# Patient Record
Sex: MALE | Race: WHITE | NOT HISPANIC OR LATINO | Employment: FULL TIME | URBAN - NONMETROPOLITAN AREA
[De-identification: names, ages, dates, MRNs, and addresses within clinical notes are randomized per-mention and may not be internally consistent; named-entity substitution may affect disease eponyms.]

---

## 2021-05-17 RX ORDER — SEMAGLUTIDE 1.34 MG/ML
1 INJECTION, SOLUTION SUBCUTANEOUS WEEKLY
Qty: 6 PEN | Refills: 1 | Status: SHIPPED | OUTPATIENT
Start: 2021-05-17 | End: 2021-07-14 | Stop reason: SDUPTHER

## 2021-05-17 RX ORDER — SEMAGLUTIDE 1.34 MG/ML
1 INJECTION, SOLUTION SUBCUTANEOUS WEEKLY
COMMUNITY
Start: 2021-05-12 | End: 2021-05-17 | Stop reason: SDUPTHER

## 2021-07-14 ENCOUNTER — OFFICE VISIT (OUTPATIENT)
Dept: FAMILY MEDICINE | Facility: CLINIC | Age: 56
End: 2021-07-14
Payer: COMMERCIAL

## 2021-07-14 VITALS
SYSTOLIC BLOOD PRESSURE: 147 MMHG | TEMPERATURE: 98 F | OXYGEN SATURATION: 98 % | WEIGHT: 315 LBS | BODY MASS INDEX: 46.65 KG/M2 | HEART RATE: 82 BPM | RESPIRATION RATE: 18 BRPM | HEIGHT: 69 IN | DIASTOLIC BLOOD PRESSURE: 91 MMHG

## 2021-07-14 DIAGNOSIS — I10 ESSENTIAL HYPERTENSION, BENIGN: Primary | ICD-10-CM

## 2021-07-14 DIAGNOSIS — E11.9 TYPE 2 DIABETES MELLITUS WITHOUT COMPLICATION, WITHOUT LONG-TERM CURRENT USE OF INSULIN: ICD-10-CM

## 2021-07-14 DIAGNOSIS — E66.01 CLASS 3 SEVERE OBESITY WITH SERIOUS COMORBIDITY AND BODY MASS INDEX (BMI) OF 45.0 TO 49.9 IN ADULT, UNSPECIFIED OBESITY TYPE: ICD-10-CM

## 2021-07-14 DIAGNOSIS — M19.90 INFLAMMATION OF JOINT: ICD-10-CM

## 2021-07-14 PROBLEM — E66.813 CLASS 3 SEVERE OBESITY WITH SERIOUS COMORBIDITY AND BODY MASS INDEX (BMI) OF 45.0 TO 49.9 IN ADULT: Status: ACTIVE | Noted: 2021-07-14

## 2021-07-14 LAB
ALBUMIN SERPL BCP-MCNC: 3.8 G/DL (ref 3.5–5)
ALBUMIN/GLOB SERPL: 1.1 {RATIO}
ALP SERPL-CCNC: 76 U/L (ref 45–115)
ALT SERPL W P-5'-P-CCNC: 37 U/L (ref 16–61)
ANION GAP SERPL CALCULATED.3IONS-SCNC: 13 MMOL/L (ref 7–16)
AST SERPL W P-5'-P-CCNC: 24 U/L (ref 15–37)
BASOPHILS # BLD AUTO: 0.09 K/UL (ref 0–0.2)
BASOPHILS NFR BLD AUTO: 0.9 % (ref 0–1)
BILIRUB SERPL-MCNC: 0.5 MG/DL (ref 0–1.2)
BILIRUB UR QL STRIP: NEGATIVE
BUN SERPL-MCNC: 13 MG/DL (ref 7–18)
BUN/CREAT SERPL: 15 (ref 6–20)
CALCIUM SERPL-MCNC: 9.2 MG/DL (ref 8.5–10.1)
CHLORIDE SERPL-SCNC: 103 MMOL/L (ref 98–107)
CHOLEST SERPL-MCNC: 132 MG/DL (ref 0–200)
CHOLEST/HDLC SERPL: 3.9 {RATIO}
CLARITY UR: CLEAR
CO2 SERPL-SCNC: 27 MMOL/L (ref 21–32)
COLOR UR: YELLOW
CREAT SERPL-MCNC: 0.88 MG/DL (ref 0.7–1.3)
CREAT UR-MCNC: 190 MG/DL (ref 39–259)
DIFFERENTIAL METHOD BLD: ABNORMAL
EOSINOPHIL # BLD AUTO: 0.28 K/UL (ref 0–0.5)
EOSINOPHIL NFR BLD AUTO: 2.7 % (ref 1–4)
ERYTHROCYTE [DISTWIDTH] IN BLOOD BY AUTOMATED COUNT: 14.8 % (ref 11.5–14.5)
EST. AVERAGE GLUCOSE BLD GHB EST-MCNC: 107 MG/DL
GLOBULIN SER-MCNC: 3.6 G/DL (ref 2–4)
GLUCOSE SERPL-MCNC: 77 MG/DL (ref 74–106)
GLUCOSE UR STRIP-MCNC: NEGATIVE MG/DL
HBA1C MFR BLD HPLC: 5.8 % (ref 4.5–6.6)
HCT VFR BLD AUTO: 42.1 % (ref 40–54)
HDLC SERPL-MCNC: 34 MG/DL (ref 40–60)
HGB BLD-MCNC: 13.6 G/DL (ref 13.5–18)
IMM GRANULOCYTES # BLD AUTO: 0.05 K/UL (ref 0–0.04)
IMM GRANULOCYTES NFR BLD: 0.5 % (ref 0–0.4)
KETONES UR STRIP-SCNC: NORMAL MG/DL
LDLC SERPL CALC-MCNC: 72 MG/DL
LDLC/HDLC SERPL: 2.1 {RATIO}
LEUKOCYTE ESTERASE UR QL STRIP: NEGATIVE
LYMPHOCYTES # BLD AUTO: 2.42 K/UL (ref 1–4.8)
LYMPHOCYTES NFR BLD AUTO: 23.3 % (ref 27–41)
MCH RBC QN AUTO: 29.4 PG (ref 27–31)
MCHC RBC AUTO-ENTMCNC: 32.3 G/DL (ref 32–36)
MCV RBC AUTO: 90.9 FL (ref 80–96)
MICROALBUMIN UR-MCNC: 1.1 MG/DL (ref 0–2.8)
MICROALBUMIN/CREAT RATIO PNL UR: 5.8 MG/G (ref 0–30)
MONOCYTES # BLD AUTO: 0.78 K/UL (ref 0–0.8)
MONOCYTES NFR BLD AUTO: 7.5 % (ref 2–6)
MPC BLD CALC-MCNC: 11.7 FL (ref 9.4–12.4)
NEUTROPHILS # BLD AUTO: 6.78 K/UL (ref 1.8–7.7)
NEUTROPHILS NFR BLD AUTO: 65.1 % (ref 53–65)
NITRITE UR QL STRIP: NEGATIVE
NONHDLC SERPL-MCNC: 98 MG/DL
NRBC # BLD AUTO: 0 X10E3/UL
NRBC, AUTO (.00): 0 %
PH UR STRIP: 6 PH UNITS
PLATELET # BLD AUTO: 250 K/UL (ref 150–400)
POTASSIUM SERPL-SCNC: 4.2 MMOL/L (ref 3.5–5.1)
PROT SERPL-MCNC: 7.4 G/DL (ref 6.4–8.2)
PROT UR QL STRIP: NEGATIVE
RBC # BLD AUTO: 4.63 M/UL (ref 4.6–6.2)
RBC # UR STRIP: NEGATIVE /UL
SODIUM SERPL-SCNC: 139 MMOL/L (ref 136–145)
SP GR UR STRIP: 1.02
TRIGL SERPL-MCNC: 131 MG/DL (ref 35–150)
UROBILINOGEN UR STRIP-ACNC: 0.2 MG/DL
VLDLC SERPL-MCNC: 26 MG/DL
WBC # BLD AUTO: 10.4 K/UL (ref 4.5–11)

## 2021-07-14 PROCEDURE — 85025 CBC WITH DIFFERENTIAL: ICD-10-PCS | Mod: ,,, | Performed by: CLINICAL MEDICAL LABORATORY

## 2021-07-14 PROCEDURE — 3077F SYST BP >= 140 MM HG: CPT | Mod: CPTII,,, | Performed by: NURSE PRACTITIONER

## 2021-07-14 PROCEDURE — 3080F DIAST BP >= 90 MM HG: CPT | Mod: CPTII,,, | Performed by: NURSE PRACTITIONER

## 2021-07-14 PROCEDURE — 80061 LIPID PANEL: CPT | Mod: ,,, | Performed by: CLINICAL MEDICAL LABORATORY

## 2021-07-14 PROCEDURE — 82570 ASSAY OF URINE CREATININE: CPT | Mod: ,,, | Performed by: CLINICAL MEDICAL LABORATORY

## 2021-07-14 PROCEDURE — 83036 HEMOGLOBIN GLYCOSYLATED A1C: CPT | Mod: ,,, | Performed by: CLINICAL MEDICAL LABORATORY

## 2021-07-14 PROCEDURE — 82043 UR ALBUMIN QUANTITATIVE: CPT | Mod: ,,, | Performed by: CLINICAL MEDICAL LABORATORY

## 2021-07-14 PROCEDURE — 3080F PR MOST RECENT DIASTOLIC BLOOD PRESSURE >= 90 MM HG: ICD-10-PCS | Mod: CPTII,,, | Performed by: NURSE PRACTITIONER

## 2021-07-14 PROCEDURE — 83036 HEMOGLOBIN A1C: ICD-10-PCS | Mod: ,,, | Performed by: CLINICAL MEDICAL LABORATORY

## 2021-07-14 PROCEDURE — 3008F PR BODY MASS INDEX (BMI) DOCUMENTED: ICD-10-PCS | Mod: CPTII,,, | Performed by: NURSE PRACTITIONER

## 2021-07-14 PROCEDURE — 3077F PR MOST RECENT SYSTOLIC BLOOD PRESSURE >= 140 MM HG: ICD-10-PCS | Mod: CPTII,,, | Performed by: NURSE PRACTITIONER

## 2021-07-14 PROCEDURE — 80061 LIPID PANEL: ICD-10-PCS | Mod: ,,, | Performed by: CLINICAL MEDICAL LABORATORY

## 2021-07-14 PROCEDURE — 99214 PR OFFICE/OUTPT VISIT, EST, LEVL IV, 30-39 MIN: ICD-10-PCS | Mod: ,,, | Performed by: NURSE PRACTITIONER

## 2021-07-14 PROCEDURE — 80053 COMPREHENSIVE METABOLIC PANEL: ICD-10-PCS | Mod: ,,, | Performed by: CLINICAL MEDICAL LABORATORY

## 2021-07-14 PROCEDURE — 99214 OFFICE O/P EST MOD 30 MIN: CPT | Mod: ,,, | Performed by: NURSE PRACTITIONER

## 2021-07-14 PROCEDURE — 3008F BODY MASS INDEX DOCD: CPT | Mod: CPTII,,, | Performed by: NURSE PRACTITIONER

## 2021-07-14 PROCEDURE — 82043 MICROALBUMIN / CREATININE RATIO URINE: ICD-10-PCS | Mod: ,,, | Performed by: CLINICAL MEDICAL LABORATORY

## 2021-07-14 PROCEDURE — 81003 URINALYSIS, REFLEX TO URINE CULTURE: ICD-10-PCS | Mod: QW,,, | Performed by: CLINICAL MEDICAL LABORATORY

## 2021-07-14 PROCEDURE — 80053 COMPREHEN METABOLIC PANEL: CPT | Mod: ,,, | Performed by: CLINICAL MEDICAL LABORATORY

## 2021-07-14 PROCEDURE — 85025 COMPLETE CBC W/AUTO DIFF WBC: CPT | Mod: ,,, | Performed by: CLINICAL MEDICAL LABORATORY

## 2021-07-14 PROCEDURE — 82570 MICROALBUMIN / CREATININE RATIO URINE: ICD-10-PCS | Mod: ,,, | Performed by: CLINICAL MEDICAL LABORATORY

## 2021-07-14 PROCEDURE — 81003 URINALYSIS AUTO W/O SCOPE: CPT | Mod: QW,,, | Performed by: CLINICAL MEDICAL LABORATORY

## 2021-07-14 RX ORDER — NABUMETONE 750 MG/1
TABLET, FILM COATED ORAL
Qty: 180 TABLET | Refills: 1 | Status: SHIPPED | OUTPATIENT
Start: 2021-07-14 | End: 2022-01-14 | Stop reason: SDUPTHER

## 2021-07-14 RX ORDER — LISINOPRIL 40 MG/1
40 TABLET ORAL DAILY
Qty: 90 TABLET | Refills: 1 | Status: SHIPPED | OUTPATIENT
Start: 2021-07-14 | End: 2022-01-14 | Stop reason: SDUPTHER

## 2021-07-14 RX ORDER — METFORMIN HYDROCHLORIDE 1000 MG/1
1000 TABLET ORAL 2 TIMES DAILY
Qty: 180 TABLET | Refills: 1 | Status: SHIPPED | OUTPATIENT
Start: 2021-07-14 | End: 2022-01-14 | Stop reason: SDUPTHER

## 2021-07-14 RX ORDER — HYDROCHLOROTHIAZIDE 12.5 MG/1
12.5 TABLET ORAL DAILY
Qty: 90 TABLET | Refills: 1 | Status: SHIPPED | OUTPATIENT
Start: 2021-07-14 | End: 2022-01-14 | Stop reason: SDUPTHER

## 2021-07-14 RX ORDER — CARVEDILOL 25 MG/1
25 TABLET ORAL 2 TIMES DAILY
Qty: 180 TABLET | Refills: 1 | Status: SHIPPED | OUTPATIENT
Start: 2021-07-14 | End: 2022-02-21

## 2021-07-14 RX ORDER — SEMAGLUTIDE 1.34 MG/ML
1 INJECTION, SOLUTION SUBCUTANEOUS WEEKLY
Qty: 6 PEN | Refills: 1 | Status: SHIPPED | OUTPATIENT
Start: 2021-07-14 | End: 2022-01-14

## 2021-07-14 RX ORDER — AMLODIPINE BESYLATE 10 MG/1
TABLET ORAL
Qty: 90 TABLET | Refills: 1 | Status: SHIPPED | OUTPATIENT
Start: 2021-07-14 | End: 2022-01-14 | Stop reason: SDUPTHER

## 2021-08-02 ENCOUNTER — IMMUNIZATION (OUTPATIENT)
Dept: FAMILY MEDICINE | Facility: CLINIC | Age: 56
End: 2021-08-02
Payer: COMMERCIAL

## 2021-08-02 DIAGNOSIS — Z23 NEED FOR VACCINATION: Primary | ICD-10-CM

## 2021-08-02 PROCEDURE — 91301 COVID-19, MRNA, LNP-S, PF, 100 MCG/0.5 ML DOSE VACCINE: ICD-10-PCS | Mod: ,,, | Performed by: FAMILY MEDICINE

## 2021-08-02 PROCEDURE — 0011A COVID-19, MRNA, LNP-S, PF, 100 MCG/0.5 ML DOSE VACCINE: ICD-10-PCS | Mod: ,,, | Performed by: FAMILY MEDICINE

## 2021-08-02 PROCEDURE — 0011A COVID-19, MRNA, LNP-S, PF, 100 MCG/0.5 ML DOSE VACCINE: CPT | Mod: ,,, | Performed by: FAMILY MEDICINE

## 2021-08-02 PROCEDURE — 91301 COVID-19, MRNA, LNP-S, PF, 100 MCG/0.5 ML DOSE VACCINE: CPT | Mod: ,,, | Performed by: FAMILY MEDICINE

## 2021-09-02 ENCOUNTER — IMMUNIZATION (OUTPATIENT)
Dept: FAMILY MEDICINE | Facility: CLINIC | Age: 56
End: 2021-09-02
Payer: COMMERCIAL

## 2021-09-02 DIAGNOSIS — Z23 NEED FOR VACCINATION: Primary | ICD-10-CM

## 2021-09-02 PROCEDURE — 91301 COVID-19, MRNA, LNP-S, PF, 100 MCG/0.5 ML DOSE VACCINE: CPT | Mod: ,,, | Performed by: FAMILY MEDICINE

## 2021-09-02 PROCEDURE — 0012A COVID-19, MRNA, LNP-S, PF, 100 MCG/0.5 ML DOSE VACCINE: CPT | Mod: CV19,,, | Performed by: FAMILY MEDICINE

## 2021-09-02 PROCEDURE — 0012A COVID-19, MRNA, LNP-S, PF, 100 MCG/0.5 ML DOSE VACCINE: ICD-10-PCS | Mod: CV19,,, | Performed by: FAMILY MEDICINE

## 2021-09-02 PROCEDURE — 91301 COVID-19, MRNA, LNP-S, PF, 100 MCG/0.5 ML DOSE VACCINE: ICD-10-PCS | Mod: ,,, | Performed by: FAMILY MEDICINE

## 2022-01-05 ENCOUNTER — OFFICE VISIT (OUTPATIENT)
Dept: FAMILY MEDICINE | Facility: CLINIC | Age: 57
End: 2022-01-05
Payer: COMMERCIAL

## 2022-01-05 VITALS
SYSTOLIC BLOOD PRESSURE: 131 MMHG | OXYGEN SATURATION: 97 % | DIASTOLIC BLOOD PRESSURE: 92 MMHG | HEIGHT: 69 IN | RESPIRATION RATE: 18 BRPM | HEART RATE: 86 BPM | WEIGHT: 315 LBS | BODY MASS INDEX: 46.65 KG/M2 | TEMPERATURE: 99 F

## 2022-01-05 DIAGNOSIS — J02.9 SORE THROAT: ICD-10-CM

## 2022-01-05 DIAGNOSIS — J01.10 ACUTE NON-RECURRENT FRONTAL SINUSITIS: Primary | ICD-10-CM

## 2022-01-05 DIAGNOSIS — E11.9 TYPE 2 DIABETES MELLITUS WITHOUT COMPLICATION, WITHOUT LONG-TERM CURRENT USE OF INSULIN: ICD-10-CM

## 2022-01-05 LAB
CTP QC/QA: YES
SARS-COV-2 AG RESP QL IA.RAPID: NEGATIVE

## 2022-01-05 PROCEDURE — 96372 PR INJECTION,THERAP/PROPH/DIAG2ST, IM OR SUBCUT: ICD-10-PCS | Mod: ,,, | Performed by: NURSE PRACTITIONER

## 2022-01-05 PROCEDURE — 1159F PR MEDICATION LIST DOCUMENTED IN MEDICAL RECORD: ICD-10-PCS | Mod: CPTII,,, | Performed by: NURSE PRACTITIONER

## 2022-01-05 PROCEDURE — 3080F PR MOST RECENT DIASTOLIC BLOOD PRESSURE >= 90 MM HG: ICD-10-PCS | Mod: CPTII,,, | Performed by: NURSE PRACTITIONER

## 2022-01-05 PROCEDURE — 96372 THER/PROPH/DIAG INJ SC/IM: CPT | Mod: ,,, | Performed by: NURSE PRACTITIONER

## 2022-01-05 PROCEDURE — 1160F PR REVIEW ALL MEDS BY PRESCRIBER/CLIN PHARMACIST DOCUMENTED: ICD-10-PCS | Mod: CPTII,,, | Performed by: NURSE PRACTITIONER

## 2022-01-05 PROCEDURE — 3075F SYST BP GE 130 - 139MM HG: CPT | Mod: CPTII,,, | Performed by: NURSE PRACTITIONER

## 2022-01-05 PROCEDURE — 87426 SARS CORONAVIRUS 2 ANTIGEN POCT: ICD-10-PCS | Mod: QW,,, | Performed by: NURSE PRACTITIONER

## 2022-01-05 PROCEDURE — 3008F PR BODY MASS INDEX (BMI) DOCUMENTED: ICD-10-PCS | Mod: CPTII,,, | Performed by: NURSE PRACTITIONER

## 2022-01-05 PROCEDURE — 3008F BODY MASS INDEX DOCD: CPT | Mod: CPTII,,, | Performed by: NURSE PRACTITIONER

## 2022-01-05 PROCEDURE — 1160F RVW MEDS BY RX/DR IN RCRD: CPT | Mod: CPTII,,, | Performed by: NURSE PRACTITIONER

## 2022-01-05 PROCEDURE — 1159F MED LIST DOCD IN RCRD: CPT | Mod: CPTII,,, | Performed by: NURSE PRACTITIONER

## 2022-01-05 PROCEDURE — 99214 OFFICE O/P EST MOD 30 MIN: CPT | Mod: 25,,, | Performed by: NURSE PRACTITIONER

## 2022-01-05 PROCEDURE — 99214 PR OFFICE/OUTPT VISIT, EST, LEVL IV, 30-39 MIN: ICD-10-PCS | Mod: 25,,, | Performed by: NURSE PRACTITIONER

## 2022-01-05 PROCEDURE — 3080F DIAST BP >= 90 MM HG: CPT | Mod: CPTII,,, | Performed by: NURSE PRACTITIONER

## 2022-01-05 PROCEDURE — 87426 SARSCOV CORONAVIRUS AG IA: CPT | Mod: QW,,, | Performed by: NURSE PRACTITIONER

## 2022-01-05 PROCEDURE — 3075F PR MOST RECENT SYSTOLIC BLOOD PRESS GE 130-139MM HG: ICD-10-PCS | Mod: CPTII,,, | Performed by: NURSE PRACTITIONER

## 2022-01-05 RX ORDER — CEFTRIAXONE 1 G/1
1 INJECTION, POWDER, FOR SOLUTION INTRAMUSCULAR; INTRAVENOUS
Status: COMPLETED | OUTPATIENT
Start: 2022-01-05 | End: 2022-01-05

## 2022-01-05 RX ORDER — AMOXICILLIN AND CLAVULANATE POTASSIUM 875; 125 MG/1; MG/1
1 TABLET, FILM COATED ORAL EVERY 12 HOURS
Qty: 14 TABLET | Refills: 0 | Status: SHIPPED | OUTPATIENT
Start: 2022-01-05 | End: 2022-01-14

## 2022-01-05 RX ORDER — NAPROXEN SODIUM 220 MG/1
81 TABLET, FILM COATED ORAL DAILY
COMMUNITY

## 2022-01-05 RX ORDER — DEXAMETHASONE SODIUM PHOSPHATE 4 MG/ML
6 INJECTION, SOLUTION INTRA-ARTICULAR; INTRALESIONAL; INTRAMUSCULAR; INTRAVENOUS; SOFT TISSUE
Status: COMPLETED | OUTPATIENT
Start: 2022-01-05 | End: 2022-01-05

## 2022-01-05 RX ORDER — FLUTICASONE PROPIONATE 50 MCG
2 SPRAY, SUSPENSION (ML) NASAL DAILY
Qty: 16 G | Refills: 0 | Status: SHIPPED | OUTPATIENT
Start: 2022-01-05 | End: 2022-01-14

## 2022-01-05 RX ADMIN — CEFTRIAXONE 1 G: 1 INJECTION, POWDER, FOR SOLUTION INTRAMUSCULAR; INTRAVENOUS at 09:01

## 2022-01-05 RX ADMIN — DEXAMETHASONE SODIUM PHOSPHATE 6 MG: 4 INJECTION, SOLUTION INTRA-ARTICULAR; INTRALESIONAL; INTRAMUSCULAR; INTRAVENOUS; SOFT TISSUE at 10:01

## 2022-01-05 NOTE — PATIENT INSTRUCTIONS
Increase fluid intake. Take meds as prescribed. Follow up if no improvement in 5-7 days.    Admission

## 2022-01-05 NOTE — PROGRESS NOTES
Cassidy Garcia DNP, [facility]    [address] [place] [address]     PATIENT NAME: Kalpesh Saleem  : 1965  DATE: 22  MRN: [id number]      Billing Provider: Cassidy Garcia DNP, FNP  Level of Service:   Patient PCP Information     Provider PCP Type    Amol Gonzalez MD General          Reason for Visit / Chief Complaint: Headache, Sore Throat, Pressure Behind the Eyes (Left side), Otalgia (left), and Cough       Update PCP  Update Chief Complaint         History of Present Illness / Problem Focused Workflow     Kalpesh Saleem presents to the clinic with Headache, Sore Throat, Pressure Behind the Eyes (Left side), Otalgia (left), and Cough     Pt c/o pressure to left side of forehead and left side of face. Pt is type 2 diabetic that is well controlled. Last hgbA1c 5.8      Review of Systems     Review of Systems   Constitutional: Negative for activity change and appetite change.   HENT: Positive for ear pain, sinus pressure/congestion and sore throat. Negative for dental problem.    Eyes:        Pressure behind left eye     Respiratory: Positive for cough. Negative for chest tightness and shortness of breath.    Cardiovascular: Negative for chest pain and palpitations.   Gastrointestinal: Negative for abdominal pain.   Genitourinary: Negative for bladder incontinence and dysuria.   Musculoskeletal: Negative for arthralgias.   Integumentary:  Negative for rash.   Neurological: Negative for dizziness, weakness and numbness.        Medical / Social / Family History     Past Medical History:   Diagnosis Date    Diabetes mellitus, type 2     Hyperlipidemia     Hypertension        History reviewed. No pertinent surgical history.    Social History    reports that he has quit smoking. He has never used smokeless tobacco. He reports current alcohol use. He reports that he does not use drugs.    Family History  's family history is not on file.    Medications and  Allergies     Medications  Outpatient Medications Marked as Taking for the 1/5/22 encounter (Office Visit) with Cassidy Garcia DNP, FNP   Medication Sig Dispense Refill    amLODIPine (NORVASC) 10 MG tablet TAKE 1 TABLET BY MOUTH EVERY DAY FOR BLOOD PRESSURE 90 tablet 1    aspirin 81 MG Chew Take 81 mg by mouth once daily.      atorvastatin (LIPITOR) 40 MG tablet TAKE 1 TABLET BY MOUTH AT BEDTIME FOR CHOLESTEROL 90 tablet 1    carvediloL (COREG) 25 MG tablet Take 1 tablet (25 mg total) by mouth 2 (two) times daily. FOR BLOOD PRESSURE 180 tablet 1    hydroCHLOROthiazide (HYDRODIURIL) 12.5 MG Tab Take 1 tablet (12.5 mg total) by mouth once daily. FOR BLOOD PRESSURE 90 tablet 1    lisinopriL (PRINIVIL,ZESTRIL) 40 MG tablet Take 1 tablet (40 mg total) by mouth once daily. FOR BLOOD PRESSURE 90 tablet 1    metFORMIN (GLUCOPHAGE) 1000 MG tablet Take 1 tablet (1,000 mg total) by mouth 2 (two) times daily. FOR DIABETES 180 tablet 1    nabumetone (RELAFEN) 750 MG tablet TAKE 2 TABLETS BY MOUTH EVERY DAY AFTER SUPPER FOR PAIN AND INFLAMMATION 180 tablet 1    OZEMPIC 1 mg/dose (2 mg/1.5 mL) PnIj Inject 1 mg into the skin once a week. For DIABETES 6 pen 1     Current Facility-Administered Medications for the 1/5/22 encounter (Office Visit) with Cassidy Garcia DNP, FNP   Medication Dose Route Frequency Provider Last Rate Last Admin    cefTRIAXone injection 1 g  1 g Intramuscular 1 time in Clinic/HOD Cassidy Garcia DNP, FNP        dexamethasone injection 6 mg  6 mg Intramuscular 1 time in Clinic/HOD Cassidy Garcia DNP, FNP           Allergies  Review of patient's allergies indicates:  No Known Allergies    Physical Examination     Vitals:    01/05/22 0850   BP: (!) 131/92   Pulse: 86   Resp: 18   Temp: 99.2 °F (37.3 °C)     Physical Exam  Vitals and nursing note reviewed.   Constitutional:       General: He is not in acute distress.     Appearance: He is normal weight.   HENT:      Nose: Congestion and rhinorrhea  present.      Left Sinus: Maxillary sinus tenderness and frontal sinus tenderness present.      Mouth/Throat:      Mouth: Mucous membranes are moist.   Eyes:      Pupils: Pupils are equal, round, and reactive to light.   Cardiovascular:      Rate and Rhythm: Normal rate and regular rhythm.      Pulses: Normal pulses.      Heart sounds: No murmur heard.      Pulmonary:      Effort: Pulmonary effort is normal. No respiratory distress.      Breath sounds: Normal breath sounds. No wheezing, rhonchi or rales.   Chest:      Chest wall: No tenderness.   Abdominal:      General: Bowel sounds are normal. There is no distension.      Palpations: Abdomen is soft.      Tenderness: There is no abdominal tenderness. There is no right CVA tenderness, left CVA tenderness, guarding or rebound.   Musculoskeletal:         General: No swelling or tenderness. Normal range of motion.      Cervical back: Normal range of motion and neck supple.      Right lower leg: No edema.      Left lower leg: No edema.   Lymphadenopathy:      Cervical: No cervical adenopathy.   Skin:     General: Skin is warm and dry.   Neurological:      General: No focal deficit present.      Mental Status: He is alert and oriented to person, place, and time.   Psychiatric:         Mood and Affect: Mood normal.         Behavior: Behavior normal.         Thought Content: Thought content normal.         Judgment: Judgment normal.          Assessment and Plan (including Health Maintenance)      Problem List  Smart Sets  Document Outside HM   :    Plan:   Instructed pt on proper use of nasal spray. Instructed pt to monitor CHO over next few days due to steroid injection may increase BS.     Health Maintenance Due   Topic Date Due    Hepatitis C Screening  Never done    Colorectal Cancer Screening  Never done    Influenza Vaccine (1) Never done       Problem List Items Addressed This Visit        Endocrine    Type 2 diabetes mellitus without complication, without  long-term current use of insulin      Other Visit Diagnoses     Acute non-recurrent frontal sinusitis    -  Primary    Relevant Medications    cefTRIAXone injection 1 g    dexamethasone injection 6 mg    amoxicillin-clavulanate 875-125mg (AUGMENTIN) 875-125 mg per tablet    chlorpheniramine-phenylephrine 4-10 mg per tablet    fluticasone propionate (FLONASE) 50 mcg/actuation nasal spray    Sore throat        Relevant Orders    SARS Coronavirus 2 Antigen, POCT (Completed)        Acute non-recurrent frontal sinusitis  -     cefTRIAXone injection 1 g  -     dexamethasone injection 6 mg  -     amoxicillin-clavulanate 875-125mg (AUGMENTIN) 875-125 mg per tablet; Take 1 tablet by mouth every 12 (twelve) hours.  Dispense: 14 tablet; Refill: 0  -     chlorpheniramine-phenylephrine 4-10 mg per tablet; Take 1 tablet by mouth 3 (three) times daily as needed for Congestion.  Dispense: 30 tablet; Refill: 0  -     fluticasone propionate (FLONASE) 50 mcg/actuation nasal spray; 2 sprays (100 mcg total) by Each Nostril route once daily.  Dispense: 16 g; Refill: 0    Sore throat  -     SARS Coronavirus 2 Antigen, POCT    Type 2 diabetes mellitus without complication, without long-term current use of insulin       Health Maintenance Topics with due status: Not Due       Topic Last Completion Date    Lipid Panel 07/14/2021    COVID-19 Vaccine 09/02/2021           Future Appointments   Date Time Provider Department Center   1/14/2022  8:15 AM Amol Gonzalez MD [facility]        Follow up if symptoms worsen or fail to improve.     Signature:  Cassidy Garcia DNP, FNP  [address] [place] [address]  Phone #: [phone]  Fax #: [phone]    Date of encounter: 1/5/22    Patient Instructions   Increase fluid intake. Take meds as prescribed. Follow up if no improvement in 5-7 days.

## 2022-01-14 ENCOUNTER — OFFICE VISIT (OUTPATIENT)
Dept: FAMILY MEDICINE | Facility: CLINIC | Age: 57
End: 2022-01-14
Payer: COMMERCIAL

## 2022-01-14 VITALS
OXYGEN SATURATION: 96 % | WEIGHT: 315 LBS | HEART RATE: 85 BPM | SYSTOLIC BLOOD PRESSURE: 150 MMHG | RESPIRATION RATE: 18 BRPM | BODY MASS INDEX: 47.74 KG/M2 | DIASTOLIC BLOOD PRESSURE: 94 MMHG | HEIGHT: 68 IN | TEMPERATURE: 97 F

## 2022-01-14 DIAGNOSIS — Z12.5 SCREENING FOR PROSTATE CANCER: ICD-10-CM

## 2022-01-14 DIAGNOSIS — E78.5 HYPERLIPIDEMIA, UNSPECIFIED HYPERLIPIDEMIA TYPE: Chronic | ICD-10-CM

## 2022-01-14 DIAGNOSIS — M19.90 INFLAMMATION OF JOINT: Chronic | ICD-10-CM

## 2022-01-14 DIAGNOSIS — Z12.11 SCREENING FOR MALIGNANT NEOPLASM OF COLON: ICD-10-CM

## 2022-01-14 DIAGNOSIS — E11.9 TYPE 2 DIABETES MELLITUS WITHOUT COMPLICATION, WITHOUT LONG-TERM CURRENT USE OF INSULIN: Primary | Chronic | ICD-10-CM

## 2022-01-14 DIAGNOSIS — I10 ESSENTIAL HYPERTENSION, BENIGN: Chronic | ICD-10-CM

## 2022-01-14 PROBLEM — E66.813 CLASS 3 SEVERE OBESITY WITH SERIOUS COMORBIDITY AND BODY MASS INDEX (BMI) OF 45.0 TO 49.9 IN ADULT: Chronic | Status: ACTIVE | Noted: 2021-07-14

## 2022-01-14 PROBLEM — E66.01 CLASS 3 SEVERE OBESITY WITH SERIOUS COMORBIDITY AND BODY MASS INDEX (BMI) OF 45.0 TO 49.9 IN ADULT: Chronic | Status: ACTIVE | Noted: 2021-07-14

## 2022-01-14 LAB
EST. AVERAGE GLUCOSE BLD GHB EST-MCNC: 117 MG/DL
HBA1C MFR BLD HPLC: 6.1 % (ref 4.5–6.6)
PSA SERPL-MCNC: 0.26 NG/ML (ref 0–3.1)

## 2022-01-14 PROCEDURE — 3077F PR MOST RECENT SYSTOLIC BLOOD PRESSURE >= 140 MM HG: ICD-10-PCS | Mod: CPTII,,, | Performed by: FAMILY MEDICINE

## 2022-01-14 PROCEDURE — 1159F PR MEDICATION LIST DOCUMENTED IN MEDICAL RECORD: ICD-10-PCS | Mod: CPTII,,, | Performed by: FAMILY MEDICINE

## 2022-01-14 PROCEDURE — 1160F PR REVIEW ALL MEDS BY PRESCRIBER/CLIN PHARMACIST DOCUMENTED: ICD-10-PCS | Mod: CPTII,,, | Performed by: FAMILY MEDICINE

## 2022-01-14 PROCEDURE — 4010F ACE/ARB THERAPY RXD/TAKEN: CPT | Mod: CPTII,,, | Performed by: FAMILY MEDICINE

## 2022-01-14 PROCEDURE — 83036 HEMOGLOBIN A1C: ICD-10-PCS | Mod: ,,, | Performed by: CLINICAL MEDICAL LABORATORY

## 2022-01-14 PROCEDURE — 1159F MED LIST DOCD IN RCRD: CPT | Mod: CPTII,,, | Performed by: FAMILY MEDICINE

## 2022-01-14 PROCEDURE — 3080F DIAST BP >= 90 MM HG: CPT | Mod: CPTII,,, | Performed by: FAMILY MEDICINE

## 2022-01-14 PROCEDURE — G0103 PSA, SCREENING: ICD-10-PCS | Mod: ,,, | Performed by: CLINICAL MEDICAL LABORATORY

## 2022-01-14 PROCEDURE — 3008F PR BODY MASS INDEX (BMI) DOCUMENTED: ICD-10-PCS | Mod: CPTII,,, | Performed by: FAMILY MEDICINE

## 2022-01-14 PROCEDURE — 99214 PR OFFICE/OUTPT VISIT, EST, LEVL IV, 30-39 MIN: ICD-10-PCS | Mod: ,,, | Performed by: FAMILY MEDICINE

## 2022-01-14 PROCEDURE — 1160F RVW MEDS BY RX/DR IN RCRD: CPT | Mod: CPTII,,, | Performed by: FAMILY MEDICINE

## 2022-01-14 PROCEDURE — 3080F PR MOST RECENT DIASTOLIC BLOOD PRESSURE >= 90 MM HG: ICD-10-PCS | Mod: CPTII,,, | Performed by: FAMILY MEDICINE

## 2022-01-14 PROCEDURE — 3077F SYST BP >= 140 MM HG: CPT | Mod: CPTII,,, | Performed by: FAMILY MEDICINE

## 2022-01-14 PROCEDURE — 3008F BODY MASS INDEX DOCD: CPT | Mod: CPTII,,, | Performed by: FAMILY MEDICINE

## 2022-01-14 PROCEDURE — G0103 PSA SCREENING: HCPCS | Mod: ,,, | Performed by: CLINICAL MEDICAL LABORATORY

## 2022-01-14 PROCEDURE — 99214 OFFICE O/P EST MOD 30 MIN: CPT | Mod: ,,, | Performed by: FAMILY MEDICINE

## 2022-01-14 PROCEDURE — 4010F PR ACE/ARB THEARPY RXD/TAKEN: ICD-10-PCS | Mod: CPTII,,, | Performed by: FAMILY MEDICINE

## 2022-01-14 PROCEDURE — 83036 HEMOGLOBIN GLYCOSYLATED A1C: CPT | Mod: ,,, | Performed by: CLINICAL MEDICAL LABORATORY

## 2022-01-14 RX ORDER — ATORVASTATIN CALCIUM 40 MG/1
40 TABLET, FILM COATED ORAL NIGHTLY
Qty: 90 TABLET | Refills: 1 | Status: SHIPPED | OUTPATIENT
Start: 2022-01-14 | End: 2022-07-08 | Stop reason: SDUPTHER

## 2022-01-14 RX ORDER — AMLODIPINE BESYLATE 10 MG/1
TABLET ORAL
Qty: 90 TABLET | Refills: 1 | Status: SHIPPED | OUTPATIENT
Start: 2022-01-14 | End: 2022-07-08 | Stop reason: SDUPTHER

## 2022-01-14 RX ORDER — LISINOPRIL 40 MG/1
40 TABLET ORAL DAILY
Qty: 90 TABLET | Refills: 1 | Status: SHIPPED | OUTPATIENT
Start: 2022-01-14 | End: 2022-07-08 | Stop reason: SDUPTHER

## 2022-01-14 RX ORDER — SEMAGLUTIDE 1.34 MG/ML
INJECTION, SOLUTION SUBCUTANEOUS
COMMUNITY
Start: 2021-10-04 | End: 2022-01-14 | Stop reason: SDUPTHER

## 2022-01-14 RX ORDER — METFORMIN HYDROCHLORIDE 1000 MG/1
1000 TABLET ORAL 2 TIMES DAILY
Qty: 180 TABLET | Refills: 1 | Status: SHIPPED | OUTPATIENT
Start: 2022-01-14 | End: 2022-07-08 | Stop reason: SDUPTHER

## 2022-01-14 RX ORDER — HYDROCHLOROTHIAZIDE 12.5 MG/1
12.5 TABLET ORAL DAILY
Qty: 90 TABLET | Refills: 1 | Status: SHIPPED | OUTPATIENT
Start: 2022-01-14 | End: 2022-07-08 | Stop reason: SDUPTHER

## 2022-01-14 RX ORDER — NABUMETONE 750 MG/1
TABLET, FILM COATED ORAL
Qty: 180 TABLET | Refills: 1 | Status: SHIPPED | OUTPATIENT
Start: 2022-01-14 | End: 2022-07-08 | Stop reason: SDUPTHER

## 2022-01-14 RX ORDER — SEMAGLUTIDE 1.34 MG/ML
1 INJECTION, SOLUTION SUBCUTANEOUS
Qty: 3 PEN | Refills: 1 | Status: SHIPPED | OUTPATIENT
Start: 2022-01-14 | End: 2022-06-20

## 2022-01-14 NOTE — ASSESSMENT & PLAN NOTE
- Check glucose outside of clinic, record numbers, and bring log to follow up visit.    - Take medications as directed, and bring all medications to every clinic appointment.    - Eat a diabetic diet, if there are concerns about what that entails, we have a diabetic educator in our clinic.    - Cardiovascular exercise at least 3 times per week, for at least 15 minutes.    - Patient should be on a Statin medication, unless patient cannot tolerate a Statin.    - Aspirin should be taken everyday,  81mg, unless a higher dose is indicated for other medicaiton conditions. Also do not recommend Aspirin for a patient with known adverse effects from Aspirin.    - Recommend yearly, dilated eye exams for all Diabetic Patients.    - Monitor feet for calluses, abrasion, or other abnormalities. Report any concerns at every clinic visit.    -   Hemoglobin A1C   Date Value Ref Range Status   07/14/2021 5.8 4.5 - 6.6 % Final     Comment:       Normal:               <5.7%  Pre-Diabetic:       5.7% to 6.4%  Diabetic:             >6.4%  Diabetic Goal:     <7%

## 2022-01-14 NOTE — PROGRESS NOTES
Amol Gonzalez MD    [address] [place] [address]     PATIENT NAME: Kalpesh Saleem  : 1965  DATE: 22  MRN: [id number]      Billing Provider: Amol Gonzalez MD  Level of Service: [place] OFFICE/OUTPT VISIT, EST, LEVL IV, 30-39 MIN  Patient PCP Information     Provider PCP Type    Amol Gonzalez MD General          Reason for Visit / Chief Complaint: Follow-up (Pt. Presents to the clinic for an evaluation of chronic health problems and medication refills. )       Update PCP  Update Chief Complaint         History of Present Illness / Problem Focused Workflow     Kalpesh Saleem presents to the clinic with Follow-up (Pt. Presents to the clinic for an evaluation of chronic health problems and medication refills. )     HPI    Review of Systems     Review of Systems   Constitutional: Negative for activity change, appetite change, chills, fatigue, fever and unexpected weight change.   HENT: Negative for nasal congestion, ear pain, hearing loss, postnasal drip, rhinorrhea, sore throat and trouble swallowing.    Eyes: Negative for discharge and visual disturbance.   Respiratory: Negative for cough, chest tightness, shortness of breath and wheezing.    Cardiovascular: Negative for chest pain, palpitations, leg swelling and claudication.   Gastrointestinal: Negative for abdominal pain, blood in stool, change in bowel habit, constipation, diarrhea, nausea, vomiting and change in bowel habit.   Endocrine: Negative for polydipsia and polyuria.   Genitourinary: Negative for difficulty urinating, dysuria, hematuria and urgency.   Musculoskeletal: Negative for arthralgias, back pain, gait problem, joint swelling, myalgias and neck pain.   Integumentary:  Negative for rash.   Neurological: Positive for headaches. Negative for weakness and memory loss.   Psychiatric/Behavioral: Negative for confusion, dysphoric mood and suicidal ideas. The patient is not  nervous/anxious.         Medical / Social / Family History     Past Medical History:   Diagnosis Date    Diabetes mellitus, type 2     Hyperlipidemia     Hypertension        History reviewed. No pertinent surgical history.    Social History    reports that he has quit smoking. He has never used smokeless tobacco. He reports current alcohol use of about 10.0 standard drinks of alcohol per week. He reports that he does not use drugs.    Family History  's family history includes Diabetes in his mother.    Medications and Allergies     Medications  Outpatient Medications Marked as Taking for the 1/14/22 encounter (Office Visit) with Amol Gonzalez MD   Medication Sig Dispense Refill    aspirin 81 MG Chew Take 81 mg by mouth once daily.      carvediloL (COREG) 25 MG tablet Take 1 tablet (25 mg total) by mouth 2 (two) times daily. FOR BLOOD PRESSURE 180 tablet 1    [DISCONTINUED] amLODIPine (NORVASC) 10 MG tablet TAKE 1 TABLET BY MOUTH EVERY DAY FOR BLOOD PRESSURE 90 tablet 1    [DISCONTINUED] atorvastatin (LIPITOR) 40 MG tablet TAKE 1 TABLET BY MOUTH AT BEDTIME FOR CHOLESTEROL 90 tablet 1    [DISCONTINUED] hydroCHLOROthiazide (HYDRODIURIL) 12.5 MG Tab Take 1 tablet (12.5 mg total) by mouth once daily. FOR BLOOD PRESSURE 90 tablet 1    [DISCONTINUED] lisinopriL (PRINIVIL,ZESTRIL) 40 MG tablet Take 1 tablet (40 mg total) by mouth once daily. FOR BLOOD PRESSURE 90 tablet 1    [DISCONTINUED] metFORMIN (GLUCOPHAGE) 1000 MG tablet Take 1 tablet (1,000 mg total) by mouth 2 (two) times daily. FOR DIABETES 180 tablet 1    [DISCONTINUED] nabumetone (RELAFEN) 750 MG tablet TAKE 2 TABLETS BY MOUTH EVERY DAY AFTER SUPPER FOR PAIN AND INFLAMMATION 180 tablet 1       Allergies  Review of patient's allergies indicates:  No Known Allergies    Physical Examination     Vitals:    01/14/22 0815   BP: (!) 150/94   Pulse: 85   Resp: 18   Temp: 97.4 °F (36.3 °C)     Physical Exam  Vitals and nursing note reviewed.    Constitutional:       General: He is not in acute distress.     Appearance: Normal appearance. He is not ill-appearing.   Eyes:      Extraocular Movements: Extraocular movements intact.      Pupils: Pupils are equal, round, and reactive to light.   Cardiovascular:      Rate and Rhythm: Normal rate and regular rhythm.      Heart sounds: Normal heart sounds.   Pulmonary:      Effort: Pulmonary effort is normal.      Breath sounds: Normal breath sounds.   Abdominal:      General: Bowel sounds are normal.      Palpations: Abdomen is soft.   Musculoskeletal:         General: Normal range of motion.   Skin:     Findings: No rash.   Neurological:      General: No focal deficit present.      Mental Status: He is alert and oriented to person, place, and time. Mental status is at baseline.   Psychiatric:         Mood and Affect: Mood normal.         Behavior: Behavior normal.          Assessment and Plan (including Health Maintenance)      Problem List  [employer]  Document Outside HM   :    Plan:         Health Maintenance Due   Topic Date Due    Hepatitis C Screening  Never done    Pneumococcal Vaccines (Age 0-64) (1 of 2 - PPSV23) Never done    Foot Exam  Never done    Eye Exam  Never done    Colorectal Cancer Screening  Never done    Influenza Vaccine (1) Never done    Hemoglobin A1c  01/14/2022       Problem List Items Addressed This Visit        Cardiac/Vascular    Essential hypertension, benign (Chronic)    Current Assessment & Plan      - Goal blood pressure for most patients is less than 130/85. Both numbers are important. If you have questions about your specific goal blood pressure, please ask at your clinic visits.   - Check blood pressure outside of clinic, record the numbers, and bring the log to all your office visits.   - Take a daily, 81mg Aspirin, unless instructed to take a different dose for another medical purpose. Also do not take Aspirin if you have a history of adverse reaction to Aspirin.   -  If you have any chest pain, SOB, or other concerning symptoms, report these immediately, or go to the nearest ER.    - Recommend cardiovascular exercise, at least 3 times per week, for at least 15 minutes.           Relevant Medications    amLODIPine (NORVASC) 10 MG tablet    hydroCHLOROthiazide (HYDRODIURIL) 12.5 MG Tab    lisinopriL (PRINIVIL,ZESTRIL) 40 MG tablet    Other Relevant Orders    Basic Metabolic Panel    CBC Auto Differential    Lipid Panel    Microalbumin/Creatinine Ratio, Urine    Urinalysis, Reflex to Urine Culture Urine, Clean Catch       Endocrine    Type 2 diabetes mellitus without complication, without long-term current use of insulin - Primary (Chronic)    Current Assessment & Plan      - Check glucose outside of clinic, record numbers, and bring log to follow up visit.    - Take medications as directed, and bring all medications to every clinic appointment.    - Eat a diabetic diet, if there are concerns about what that entails, we have a diabetic educator in our clinic.    - Cardiovascular exercise at least 3 times per week, for at least 15 minutes.    - Patient should be on a Statin medication, unless patient cannot tolerate a Statin.    - Aspirin should be taken everyday,  81mg, unless a higher dose is indicated for other medicaiton conditions. Also do not recommend Aspirin for a patient with known adverse effects from Aspirin.    - Recommend yearly, dilated eye exams for all Diabetic Patients.    - Monitor feet for calluses, abrasion, or other abnormalities. Report any concerns at every clinic visit.    -   Hemoglobin A1C   Date Value Ref Range Status   07/14/2021 5.8 4.5 - 6.6 % Final     Comment:       Normal:               <5.7%  Pre-Diabetic:       5.7% to 6.4%  Diabetic:             >6.4%  Diabetic Goal:     <7%               Relevant Medications    metFORMIN (GLUCOPHAGE) 1000 MG tablet    OZEMPIC 1 mg/dose (4 mg/3 mL)    Other Relevant Orders    Basic Metabolic Panel    CBC Auto  Differential    Lipid Panel    Microalbumin/Creatinine Ratio, Urine    Urinalysis, Reflex to Urine Culture Urine, Clean Catch    Hemoglobin A1C       Orthopedic    Inflammation of joint    Relevant Medications    nabumetone (RELAFEN) 750 MG tablet      Other Visit Diagnoses     Hyperlipidemia, unspecified hyperlipidemia type  (Chronic)       Relevant Medications    atorvastatin (LIPITOR) 40 MG tablet        Type 2 diabetes mellitus without complication, without long-term current use of insulin  -     Basic Metabolic Panel; Future; Expected date: 01/14/2022  -     CBC Auto Differential; Future; Expected date: 01/14/2022  -     Lipid Panel; Future; Expected date: 01/14/2022  -     Microalbumin/Creatinine Ratio, Urine; Future; Expected date: 01/14/2022  -     Urinalysis, Reflex to Urine Culture Urine, Clean Catch; Future; Expected date: 01/14/2022  -     Hemoglobin A1C; Future; Expected date: 01/14/2022  -     metFORMIN (GLUCOPHAGE) 1000 MG tablet; Take 1 tablet (1,000 mg total) by mouth 2 (two) times daily. FOR DIABETES  Dispense: 180 tablet; Refill: 1  -     OZEMPIC 1 mg/dose (4 mg/3 mL); Inject 1 mg into the skin every 7 days. For BLOOD SUGAR  Dispense: 3 pen; Refill: 1    Essential hypertension, benign  -     Basic Metabolic Panel; Future; Expected date: 01/14/2022  -     CBC Auto Differential; Future; Expected date: 01/14/2022  -     Lipid Panel; Future; Expected date: 01/14/2022  -     Microalbumin/Creatinine Ratio, Urine; Future; Expected date: 01/14/2022  -     Urinalysis, Reflex to Urine Culture Urine, Clean Catch; Future; Expected date: 01/14/2022  -     amLODIPine (NORVASC) 10 MG tablet; TAKE 1 TABLET BY MOUTH EVERY DAY FOR BLOOD PRESSURE  Dispense: 90 tablet; Refill: 1  -     hydroCHLOROthiazide (HYDRODIURIL) 12.5 MG Tab; Take 1 tablet (12.5 mg total) by mouth once daily. FOR BLOOD PRESSURE  Dispense: 90 tablet; Refill: 1  -     lisinopriL (PRINIVIL,ZESTRIL) 40 MG tablet; Take 1 tablet (40 mg total) by  mouth once daily. FOR BLOOD PRESSURE  Dispense: 90 tablet; Refill: 1    Inflammation of joint  -     nabumetone (RELAFEN) 750 MG tablet; TAKE 2 TABLETS BY MOUTH EVERY DAY AFTER SUPPER FOR PAIN AND INFLAMMATION  Dispense: 180 tablet; Refill: 1    Hyperlipidemia, unspecified hyperlipidemia type  -     atorvastatin (LIPITOR) 40 MG tablet; Take 1 tablet (40 mg total) by mouth nightly. For CHOLESTEROL  Dispense: 90 tablet; Refill: 1       Health Maintenance Topics with due status: Not Due       Topic Last Completion Date    Diabetes Urine Screening 07/14/2021    Lipid Panel 07/14/2021    COVID-19 Vaccine 09/02/2021    Low Dose Statin 01/05/2022       Procedures     No future appointments.     Follow up in about 6 months (around 7/14/2022) for chronic health problems.     Signature:  Amol Gonzalez MD  [address] [place] [address]  Phone #: [phone]  Fax #: [phone]    Date of encounter: 1/14/22    Patient Instructions   Patient Education       Treatment for Type 2 Diabetes   The Basics   Written by the doctors and editors at [facility]   What are the goals of type 2 diabetes treatment? -- The goals of treatment for type 2 diabetes are:  · To keep your blood sugar under control   · To prevent future health problems that can happen in people with diabetes  How is type 2 diabetes treated? -- Type 2 diabetes can be treated with:  · Diet changes  · Lifestyle changes  · Medicines  Your doctor or nurse will work with you to make a treatment plan that is right for you.  What diet and lifestyle changes might be part of my treatment? -- As part of your treatment, your doctor or nurse might recommend that you:  · Eat healthy foods  · Lose weight if you are overweight  · Get plenty of physical activity  · Not smoke  Making these lifestyle changes is as important as taking your medicines. They will also help improve your overall health.  What medicines are used to treat type  "2 diabetes? -- Different medicines can be used to treat type 2 diabetes. The first medicine that most people with type 2 diabetes take is a pill called metformin (brand name: Glucophage).  How do I know if my treatment is working? -- Your doctor can do a blood test called an "A1C." This test shows what your blood sugar level has been over the past 2 to 3 months.  Another way to know if your treatment is working is to check your blood sugar level yourself. Many people with type 2 diabetes do not need to do this, but some do. It involves using a device called a "blood glucose monitor." If your doctor recommends doing this, they will explain how and when to use the device.  What if my blood sugar level is still higher than normal? -- If your blood sugar level is still higher than normal after taking metformin for 2 to 3 months, your doctor might increase your dose. If you are already taking the highest possible dose, your doctor might suggest adding a second medicine.  Which second medicine will I take? -- There are different medicines your doctor can prescribe. The second medicine could be another pill you take every day, or a shot you give yourself once a day or once a week. The choice will depend on different things, including how high your blood sugar is, your weight, your other health problems, and whether you are comfortable giving yourself a shot.   A few of these medicines can cause low blood sugar as a side effect. Symptoms of low blood sugar can include:  · Sweating and shaking  · Feeling hungry  · Feeling worried  Low blood sugar should be treated quickly because it can cause you to pass out. Your doctor or nurse will tell you ahead of time if your medicine can cause low blood sugar and how to treat it.  What is insulin? -- Insulin is a hormone that is normally made by the pancreas, an organ in the belly. It helps sugar get into your body's cells. In most people with type 2 diabetes, the body does not " respond to insulin normally. Then, over time, the pancreas stops making enough insulin.  Most people with type 2 diabetes can control their blood sugar with healthy eating, physical activity, and medicines. Some people need to take insulin as part of their treatment plan.   Insulin might be prescribed as a second medicine, or as the only medicine. It usually comes in the form of a shot that people give themselves (figure 1).   If your doctor prescribes insulin, they will tell you:  · Which type of insulin to use - There are different types of insulin. Some types work faster or last longer than others.  · How much insulin to use  · When to use it  · How to give yourself the shot  · When to check your blood sugar level  · How to avoid low blood sugar  If you take insulin, your dose will need to change if you get sick, have surgery, travel, or eat out. Your doctor or nurse will talk to you about when and how to change your dose.  What other treatments might I need? -- Sometimes, people with type 2 diabetes need medicines to treat or prevent other health problems. For example, if you have high blood pressure, you might take medicine to lower your blood pressure. This can reduce your chances of having a heart attack or stroke.  When should I see my doctor or nurse? -- Most people with diabetes see their doctor or nurse every 3 or 4 months. During these visits, they will talk with you about your medicines and blood sugar levels. If your blood sugar levels are not where they should be, your doctor or nurse might make changes to your treatment plan.  Taking care of diabetes can be hard, and some people feel sad, stressed, or anxious. Let your doctor or nurse know if you are struggling so they can help.  All topics are updated as new evidence becomes available and our peer review process is complete.  This topic retrieved from [employer] on: Sep 21, 2021.  Topic 89199 Version 8.0  Release: 29.4.2 - C29.263  © 2021 [employer]  [employer] and/or its affiliates. All rights reserved.  figure 1: How to give an insulin shot     You can give yourself insulin using a needle and syringe (panel A) or a pen injector (panel B):  1. Pinch up some skin and quickly insert the needle. Keep the skin pinched to avoid having the insulin go into the muscle. Depending upon your body type, you may not need to pinch up a fold of skin.  2. Push the plunger down all the way and count to 5.   3. Let go of the skin and remove the needle. If you can see blood or clear fluid (insulin) where the shot went in, press on the area for 5 to 8 seconds, but do not rub.  Graphic 62785 Version 11.0    Consumer Information Use and Disclaimer   This information is not specific medical advice and does not replace information you receive from your health care provider. This is only a brief summary of general information. It does NOT include all information about conditions, illnesses, injuries, tests, procedures, treatments, therapies, discharge instructions or life-style choices that may apply to you. You must talk with your health care provider for complete information about your health and treatment options. This information should not be used to decide whether or not to accept your health care provider's advice, instructions or recommendations. Only your health care provider has the knowledge and training to provide advice that is right for you. The use of this information is governed by the [employer] End User License Agreement, available at https://www.[employer]ORize/en/solutions/[employer]MET[employer]/about/stefania.The use of [employer] content is governed by the [employer] Terms of Use. ©2021 [employer], Inc. All rights reserved.  Copyright   © 2021 Up[employer]Date, Inc. and/or its affiliates. All rights reserved.  Patient Education       Controlling Your Blood Pressure Through Lifestyle   The Basics   Written by the doctors and editors at [employer]   What does my lifestyle have to do with my blood  "pressure? -- The things you do and the foods you eat have a big effect on your blood pressure and your overall health. Following the right lifestyle can:  · Lower your blood pressure or keep you from getting high blood pressure in the first place  · Reduce your need for blood pressure medicines  · Make medicines for high blood pressure work better, if you do take them  · Lower the chances that you'll have a heart attack or stroke, or develop kidney disease  Which lifestyle choices will help lower my blood pressure? -- Here's what you can do:  · Lose weight (if you are overweight)  · Choose a diet rich in fruits, vegetables, and low-fat dairy products, and low in meats, sweets, and refined grains  · Eat less salt (sodium)  · Do something active for at least 30 minutes a day on most days of the week  · Limit the amount of alcohol you drink  If you have high blood pressure, it's also very important to quit smoking (if you smoke). Quitting smoking might not bring your blood pressure down. But it will lower the chances that you'll have a heart attack or stroke, and it will help you feel better and live longer.  Start low and go slow -- The changes listed above might sound like a lot, but don't worry. You don't have to change everything all at once. The key to improving your lifestyle is to "start low and go slow." Choose 1 small, specific thing to change and try doing it for a while. If it works for you, keep doing it until it becomes a habit. If it doesn't, don't give up. Choose something else to change and see how that goes.  Let's say, for example, that you would like to improve your diet. If you're the type of person who eats cheeseburgers and French fries all the time, you can't switch to eating just salads from one day to the next. When people try to make changes like that, they often fail. Then they feel frustrated and tend to give up. So instead of trying to change everything about your diet in 1 day, change 1 or " "2 small things about your diet and give yourself time to get used to those changes. For instance, keep the cheeseburger but give up the French fries. Or eat the same things but cut your portions in half.  As you find things that you are able to change and stick with, keep adding new changes. In time, you will see that you can actually change a lot. You just have to get used to the changes slowly.  Lose weight -- When people think about losing weight, they sometimes make it more complicated than it really is. To lose weight, you have to either eat less or move more. If you do both of those things, it's even better. But there is no single weight-loss diet or activity that's better than any other. When it comes to weight loss, the most effective plan is the one that you'll stick with.  Improve your diet -- There is no single diet that is right for everyone. But in general, a healthy diet can include:  · Lots of fruits, vegetables, and whole grains  · Some beans, peas, lentils, chickpeas, and similar foods  · Some nuts, such as walnuts, almonds, and peanuts  · Fat-free or low-fat milk and milk products  · Some fish  To have a healthy diet, it's also important to limit or avoid sugar, sweets, meats, and refined grains. (Refined grains are found in white bread, white rice, most forms of pasta, and most packaged "snack" foods.)  Reduce salt -- Many people think that eating a low-sodium diet means avoiding the salt shaker and not adding salt when cooking. The truth is, not adding salt at the table or when you cook will only help a little. Almost all of the sodium you eat is already in the food you buy at the grocery store or at restaurants (figure 1).  The most important thing you can do to cut down on sodium is to eat less processed food. That means that you should avoid most foods that are sold in cans, boxes, jars, and bags. You should also eat in restaurants less often.  To reduce the amount of sodium you get, buy fresh " "or fresh-frozen fruits, vegetables, and meats. (Fresh-frozen foods have had nothing added to them before freezing.) Then you can make meals at home, from scratch, with these ingredients.  As with the other changes, don't try to cut out salt all at once. Instead, choose 1 or 2 foods that have a lot of sodium and try to replace them with low-sodium choices. When you get used to those low-sodium options, find another food or 2 to change. Then keep going, until all the foods you eat are sodium-free or low in sodium.  Become more active -- If you want to be more active, you don't have to go to the gym or get all sweaty. It is possible to increase your activity level while doing everyday things you enjoy. Walking, gardening, and dancing are just a few of the things that you might try. As with all the other changes, the key is not to do too much too fast. If you don't do any activity now, start by walking for just a few minutes every other day. Do that for a few weeks. If you stick with it, try doing it for longer. But if you find that you don't like walking, try a different activity.  Drink less alcohol -- If you are a woman, do not have more than 1 "standard drink" of alcohol a day. If you are a man, do not have more than 2. A "standard drink" is:  · A can or bottle that has 12 ounces of beer  · A glass that has 5 ounces of wine  · A shot that has 1.5 ounces of whiskey  Where should I start? -- If you want to improve your lifestyle, start by making the changes that you think would be easiest for you. If you used to exercise and just got out of the habit, maybe it would be easy for you to start exercising again. Or if you actually like cooking meals from scratch, maybe the first thing you should focus on is eating home-cooked meals that are low in sodium.  Whatever you tackle first, choose specific, realistic goals, and give yourself a deadline. For example, do not decide that you are going to "exercise more." Instead, " decide that you are going to walk for 10 minutes on Monday, Wednesday, and Friday, and that you are going to do this for the next 2 weeks.  When lifestyle changes are too general, people have a hard time following through.  Now go. You can do it!  All topics are updated as new evidence becomes available and our peer review process is complete.  This topic retrieved from [employer] on: Sep 21, 2021.  Topic 13027 Version 8.0  Release: 29.4.2 - C29.263  © 2021 UpToDate, Inc. and/or its affiliates. All rights reserved.  figure 1: Sources of sodium in your diet     Graphic 96153 Version 2.0    Consumer Information Use and Disclaimer   This information is not specific medical advice and does not replace information you receive from your health care provider. This is only a brief summary of general information. It does NOT include all information about conditions, illnesses, injuries, tests, procedures, treatments, therapies, discharge instructions or life-style choices that may apply to you. You must talk with your health care provider for complete information about your health and treatment options. This information should not be used to decide whether or not to accept your health care provider's advice, instructions or recommendations. Only your health care provider has the knowledge and training to provide advice that is right for you. The use of this information is governed by the [employer] End User License Agreement, available at https://www.PayUsLessRx.com.A[employer]/en/solutions/[employer]/about/stefania.The use of [employer] content is governed by the [employer] Terms of Use. ©2021 UpToDate, Inc. All rights reserved.  Copyright   © 2021 UpToDate, Inc. and/or its affiliates. All rights reserved.

## 2022-01-14 NOTE — PATIENT INSTRUCTIONS
"Patient Education       Treatment for Type 2 Diabetes   The Basics   Written by the doctors and editors at [facility]   What are the goals of type 2 diabetes treatment? -- The goals of treatment for type 2 diabetes are:  · To keep your blood sugar under control   · To prevent future health problems that can happen in people with diabetes  How is type 2 diabetes treated? -- Type 2 diabetes can be treated with:  · Diet changes  · Lifestyle changes  · Medicines  Your doctor or nurse will work with you to make a treatment plan that is right for you.  What diet and lifestyle changes might be part of my treatment? -- As part of your treatment, your doctor or nurse might recommend that you:  · Eat healthy foods  · Lose weight if you are overweight  · Get plenty of physical activity  · Not smoke  Making these lifestyle changes is as important as taking your medicines. They will also help improve your overall health.  What medicines are used to treat type 2 diabetes? -- Different medicines can be used to treat type 2 diabetes. The first medicine that most people with type 2 diabetes take is a pill called metformin (brand name: Glucophage).  How do I know if my treatment is working? -- Your doctor can do a blood test called an "A1C." This test shows what your blood sugar level has been over the past 2 to 3 months.  Another way to know if your treatment is working is to check your blood sugar level yourself. Many people with type 2 diabetes do not need to do this, but some do. It involves using a device called a "blood glucose monitor." If your doctor recommends doing this, they will explain how and when to use the device.  What if my blood sugar level is still higher than normal? -- If your blood sugar level is still higher than normal after taking metformin for 2 to 3 months, your doctor might increase your dose. If you are already taking the highest possible dose, your doctor might suggest adding a second medicine.  Which " second medicine will I take? -- There are different medicines your doctor can prescribe. The second medicine could be another pill you take every day, or a shot you give yourself once a day or once a week. The choice will depend on different things, including how high your blood sugar is, your weight, your other health problems, and whether you are comfortable giving yourself a shot.   A few of these medicines can cause low blood sugar as a side effect. Symptoms of low blood sugar can include:  · Sweating and shaking  · Feeling hungry  · Feeling worried  Low blood sugar should be treated quickly because it can cause you to pass out. Your doctor or nurse will tell you ahead of time if your medicine can cause low blood sugar and how to treat it.  What is insulin? -- Insulin is a hormone that is normally made by the pancreas, an organ in the belly. It helps sugar get into your body's cells. In most people with type 2 diabetes, the body does not respond to insulin normally. Then, over time, the pancreas stops making enough insulin.  Most people with type 2 diabetes can control their blood sugar with healthy eating, physical activity, and medicines. Some people need to take insulin as part of their treatment plan.   Insulin might be prescribed as a second medicine, or as the only medicine. It usually comes in the form of a shot that people give themselves (figure 1).   If your doctor prescribes insulin, they will tell you:  · Which type of insulin to use - There are different types of insulin. Some types work faster or last longer than others.  · How much insulin to use  · When to use it  · How to give yourself the shot  · When to check your blood sugar level  · How to avoid low blood sugar  If you take insulin, your dose will need to change if you get sick, have surgery, travel, or eat out. Your doctor or nurse will talk to you about when and how to change your dose.  What other treatments might I need? -- Sometimes,  people with type 2 diabetes need medicines to treat or prevent other health problems. For example, if you have high blood pressure, you might take medicine to lower your blood pressure. This can reduce your chances of having a heart attack or stroke.  When should I see my doctor or nurse? -- Most people with diabetes see their doctor or nurse every 3 or 4 months. During these visits, they will talk with you about your medicines and blood sugar levels. If your blood sugar levels are not where they should be, your doctor or nurse might make changes to your treatment plan.  Taking care of diabetes can be hard, and some people feel sad, stressed, or anxious. Let your doctor or nurse know if you are struggling so they can help.  All topics are updated as new evidence becomes available and our peer review process is complete.  This topic retrieved from [employer] on: Sep 21, 2021.  Topic 24352 Version 8.0  Release: 29.4.2 - C29.263  © 2021 UpToDate, Inc. and/or its affiliates. All rights reserved.  figure 1: How to give an insulin shot     You can give yourself insulin using a needle and syringe (panel A) or a pen injector (panel B):  1. Pinch up some skin and quickly insert the needle. Keep the skin pinched to avoid having the insulin go into the muscle. Depending upon your body type, you may not need to pinch up a fold of skin.  2. Push the plunger down all the way and count to 5.   3. Let go of the skin and remove the needle. If you can see blood or clear fluid (insulin) where the shot went in, press on the area for 5 to 8 seconds, but do not rub.  Graphic 32216 Version 11.0    Consumer Information Use and Disclaimer   This information is not specific medical advice and does not replace information you receive from your health care provider. This is only a brief summary of general information. It does NOT include all information about conditions, illnesses, injuries, tests, procedures, treatments, therapies, discharge  instructions or life-style choices that may apply to you. You must talk with your health care provider for complete information about your health and treatment options. This information should not be used to decide whether or not to accept your health care provider's advice, instructions or recommendations. Only your health care provider has the knowledge and training to provide advice that is right for you. The use of this information is governed by the [employer] End User License Agreement, available at https://www.ThrowMotion/en/solutions/[employer]./about/stefania.The use of E[employer]ight content is governed by the [employer] Terms of Use. ©2021 UpToDate, Inc. All rights reserved.  Copyright   © 2021 UpToDate, Inc. and/or its affiliates. All rights reserved.  Patient Education       Controlling Your Blood Pressure Through Lifestyle   The Basics   Written by the doctors and editors at [facility]   What does my lifestyle have to do with my blood pressure? -- The things you do and the foods you eat have a big effect on your blood pressure and your overall health. Following the right lifestyle can:  · Lower your blood pressure or keep you from getting high blood pressure in the first place  · Reduce your need for blood pressure medicines  · Make medicines for high blood pressure work better, if you do take them  · Lower the chances that you'll have a heart attack or stroke, or develop kidney disease  Which lifestyle choices will help lower my blood pressure? -- Here's what you can do:  · Lose weight (if you are overweight)  · Choose a diet rich in fruits, vegetables, and low-fat dairy products, and low in meats, sweets, and refined grains  · Eat less salt (sodium)  · Do something active for at least 30 minutes a day on most days of the week  · Limit the amount of alcohol you drink  If you have high blood pressure, it's also very important to quit smoking (if you smoke). Quitting smoking might not bring your blood pressure down.  "But it will lower the chances that you'll have a heart attack or stroke, and it will help you feel better and live longer.  Start low and go slow -- The changes listed above might sound like a lot, but don't worry. You don't have to change everything all at once. The key to improving your lifestyle is to "start low and go slow." Choose 1 small, specific thing to change and try doing it for a while. If it works for you, keep doing it until it becomes a habit. If it doesn't, don't give up. Choose something else to change and see how that goes.  Let's say, for example, that you would like to improve your diet. If you're the type of person who eats cheeseburgers and French fries all the time, you can't switch to eating just salads from one day to the next. When people try to make changes like that, they often fail. Then they feel frustrated and tend to give up. So instead of trying to change everything about your diet in 1 day, change 1 or 2 small things about your diet and give yourself time to get used to those changes. For instance, keep the cheeseburger but give up the French fries. Or eat the same things but cut your portions in half.  As you find things that you are able to change and stick with, keep adding new changes. In time, you will see that you can actually change a lot. You just have to get used to the changes slowly.  Lose weight -- When people think about losing weight, they sometimes make it more complicated than it really is. To lose weight, you have to either eat less or move more. If you do both of those things, it's even better. But there is no single weight-loss diet or activity that's better than any other. When it comes to weight loss, the most effective plan is the one that you'll stick with.  Improve your diet -- There is no single diet that is right for everyone. But in general, a healthy diet can include:  · Lots of fruits, vegetables, and whole grains  · Some beans, peas, lentils, chickpeas, " "and similar foods  · Some nuts, such as walnuts, almonds, and peanuts  · Fat-free or low-fat milk and milk products  · Some fish  To have a healthy diet, it's also important to limit or avoid sugar, sweets, meats, and refined grains. (Refined grains are found in white bread, white rice, most forms of pasta, and most packaged "snack" foods.)  Reduce salt -- Many people think that eating a low-sodium diet means avoiding the salt shaker and not adding salt when cooking. The truth is, not adding salt at the table or when you cook will only help a little. Almost all of the sodium you eat is already in the food you buy at the grocery store or at restaurants (figure 1).  The most important thing you can do to cut down on sodium is to eat less processed food. That means that you should avoid most foods that are sold in cans, boxes, jars, and bags. You should also eat in restaurants less often.  To reduce the amount of sodium you get, buy fresh or fresh-frozen fruits, vegetables, and meats. (Fresh-frozen foods have had nothing added to them before freezing.) Then you can make meals at home, from scratch, with these ingredients.  As with the other changes, don't try to cut out salt all at once. Instead, choose 1 or 2 foods that have a lot of sodium and try to replace them with low-sodium choices. When you get used to those low-sodium options, find another food or 2 to change. Then keep going, until all the foods you eat are sodium-free or low in sodium.  Become more active -- If you want to be more active, you don't have to go to the gym or get all sweaty. It is possible to increase your activity level while doing everyday things you enjoy. Walking, gardening, and dancing are just a few of the things that you might try. As with all the other changes, the key is not to do too much too fast. If you don't do any activity now, start by walking for just a few minutes every other day. Do that for a few weeks. If you stick with it, " "try doing it for longer. But if you find that you don't like walking, try a different activity.  Drink less alcohol -- If you are a woman, do not have more than 1 "standard drink" of alcohol a day. If you are a man, do not have more than 2. A "standard drink" is:  · A can or bottle that has 12 ounces of beer  · A glass that has 5 ounces of wine  · A shot that has 1.5 ounces of whiskey  Where should I start? -- If you want to improve your lifestyle, start by making the changes that you think would be easiest for you. If you used to exercise and just got out of the habit, maybe it would be easy for you to start exercising again. Or if you actually like cooking meals from scratch, maybe the first thing you should focus on is eating home-cooked meals that are low in sodium.  Whatever you tackle first, choose specific, realistic goals, and give yourself a deadline. For example, do not decide that you are going to "exercise more." Instead, decide that you are going to walk for 10 minutes on Monday, Wednesday, and Friday, and that you are going to do this for the next 2 weeks.  When lifestyle changes are too general, people have a hard time following through.  Now go. You can do it!  All topics are updated as new evidence becomes available and our peer review process is complete.  This topic retrieved from [employer] on: Sep 21, 2021.  Topic 59590 Version 8.0  Release: 29.4.2 - C29.263  © 2021 UpToDate, Inc. and/or its affiliates. All rights reserved.  figure 1: Sources of sodium in your diet     Graphic 38904 Version 2.0    Consumer Information Use and Disclaimer   This information is not specific medical advice and does not replace information you receive from your health care provider. This is only a brief summary of general information. It does NOT include all information about conditions, illnesses, injuries, tests, procedures, treatments, therapies, discharge instructions or life-style choices that may apply to you. " You must talk with your health care provider for complete information about your health and treatment options. This information should not be used to decide whether or not to accept your health care provider's advice, instructions or recommendations. Only your health care provider has the knowledge and training to provide advice that is right for you. The use of this information is governed by the [employer] End User License Agreement, available at https://www.[employer]/en/solutions/[employer]/about/stefania.The use of [employer] content is governed by the [employer] Terms of Use. ©2021 UpToDate, Inc. All rights reserved.  Copyright   © 2021 UpToDate, Inc. and/or its affiliates. All rights reserved.

## 2022-01-19 ENCOUNTER — OFFICE VISIT (OUTPATIENT)
Dept: SURGERY | Facility: CLINIC | Age: 57
End: 2022-01-19
Attending: SURGERY
Payer: COMMERCIAL

## 2022-01-19 VITALS
HEIGHT: 68 IN | SYSTOLIC BLOOD PRESSURE: 117 MMHG | OXYGEN SATURATION: 95 % | WEIGHT: 315 LBS | TEMPERATURE: 98 F | DIASTOLIC BLOOD PRESSURE: 64 MMHG | HEART RATE: 85 BPM | RESPIRATION RATE: 20 BRPM | BODY MASS INDEX: 47.74 KG/M2

## 2022-01-19 DIAGNOSIS — Z12.11 SCREENING FOR MALIGNANT NEOPLASM OF COLON: Primary | ICD-10-CM

## 2022-01-19 PROCEDURE — 3044F HG A1C LEVEL LT 7.0%: CPT | Mod: CPTII,,, | Performed by: SURGERY

## 2022-01-19 PROCEDURE — 4010F ACE/ARB THERAPY RXD/TAKEN: CPT | Mod: CPTII,,, | Performed by: SURGERY

## 2022-01-19 PROCEDURE — 1159F MED LIST DOCD IN RCRD: CPT | Mod: CPTII,,, | Performed by: SURGERY

## 2022-01-19 PROCEDURE — 1160F RVW MEDS BY RX/DR IN RCRD: CPT | Mod: CPTII,,, | Performed by: SURGERY

## 2022-01-19 PROCEDURE — 3074F SYST BP LT 130 MM HG: CPT | Mod: CPTII,,, | Performed by: SURGERY

## 2022-01-19 PROCEDURE — 3078F PR MOST RECENT DIASTOLIC BLOOD PRESSURE < 80 MM HG: ICD-10-PCS | Mod: CPTII,,, | Performed by: SURGERY

## 2022-01-19 PROCEDURE — 1160F PR REVIEW ALL MEDS BY PRESCRIBER/CLIN PHARMACIST DOCUMENTED: ICD-10-PCS | Mod: CPTII,,, | Performed by: SURGERY

## 2022-01-19 PROCEDURE — 99203 PR OFFICE/OUTPT VISIT, NEW, LEVL III, 30-44 MIN: ICD-10-PCS | Mod: S$PBB,,, | Performed by: SURGERY

## 2022-01-19 PROCEDURE — 3078F DIAST BP <80 MM HG: CPT | Mod: CPTII,,, | Performed by: SURGERY

## 2022-01-19 PROCEDURE — 1159F PR MEDICATION LIST DOCUMENTED IN MEDICAL RECORD: ICD-10-PCS | Mod: CPTII,,, | Performed by: SURGERY

## 2022-01-19 PROCEDURE — 3008F PR BODY MASS INDEX (BMI) DOCUMENTED: ICD-10-PCS | Mod: CPTII,,, | Performed by: SURGERY

## 2022-01-19 PROCEDURE — 99213 OFFICE O/P EST LOW 20 MIN: CPT | Mod: PBBFAC | Performed by: SURGERY

## 2022-01-19 PROCEDURE — 3074F PR MOST RECENT SYSTOLIC BLOOD PRESSURE < 130 MM HG: ICD-10-PCS | Mod: CPTII,,, | Performed by: SURGERY

## 2022-01-19 PROCEDURE — 3044F PR MOST RECENT HEMOGLOBIN A1C LEVEL <7.0%: ICD-10-PCS | Mod: CPTII,,, | Performed by: SURGERY

## 2022-01-19 PROCEDURE — 4010F PR ACE/ARB THEARPY RXD/TAKEN: ICD-10-PCS | Mod: CPTII,,, | Performed by: SURGERY

## 2022-01-19 PROCEDURE — 99203 OFFICE O/P NEW LOW 30 MIN: CPT | Mod: S$PBB,,, | Performed by: SURGERY

## 2022-01-19 PROCEDURE — 3008F BODY MASS INDEX DOCD: CPT | Mod: CPTII,,, | Performed by: SURGERY

## 2022-01-19 RX ORDER — SODIUM CHLORIDE, SODIUM LACTATE, POTASSIUM CHLORIDE, CALCIUM CHLORIDE 600; 310; 30; 20 MG/100ML; MG/100ML; MG/100ML; MG/100ML
INJECTION, SOLUTION INTRAVENOUS CONTINUOUS
Status: CANCELLED | OUTPATIENT
Start: 2022-01-19

## 2022-01-19 NOTE — PROGRESS NOTES
General Surgery History and Physical      Patient ID: Kalpesh Saleem is a 56 y.o. male.    Chief Complaint: No chief complaint on file.      HPI:  56-year-old male who has never had a colonoscopy before.  He denies any symptoms or complaints.  No abdominal pain, blood in stool, weight loss, constipation.  No family history of colon cancer.    Review of Systems   Constitutional: Negative for activity change, appetite change, fatigue and fever.   HENT: Negative for trouble swallowing.    Respiratory: Negative for cough and shortness of breath.    Cardiovascular: Negative for chest pain and palpitations.   Gastrointestinal: Negative for abdominal distention, abdominal pain, blood in stool, constipation and diarrhea.   Genitourinary: Negative for flank pain.   Musculoskeletal: Negative for neck pain and neck stiffness.   Neurological: Negative for weakness.       Current Outpatient Medications   Medication Sig Dispense Refill    amLODIPine (NORVASC) 10 MG tablet TAKE 1 TABLET BY MOUTH EVERY DAY FOR BLOOD PRESSURE 90 tablet 1    aspirin 81 MG Chew Take 81 mg by mouth once daily.      atorvastatin (LIPITOR) 40 MG tablet Take 1 tablet (40 mg total) by mouth nightly. For CHOLESTEROL 90 tablet 1    carvediloL (COREG) 25 MG tablet Take 1 tablet (25 mg total) by mouth 2 (two) times daily. FOR BLOOD PRESSURE 180 tablet 1    hydroCHLOROthiazide (HYDRODIURIL) 12.5 MG Tab Take 1 tablet (12.5 mg total) by mouth once daily. FOR BLOOD PRESSURE 90 tablet 1    lisinopriL (PRINIVIL,ZESTRIL) 40 MG tablet Take 1 tablet (40 mg total) by mouth once daily. FOR BLOOD PRESSURE 90 tablet 1    metFORMIN (GLUCOPHAGE) 1000 MG tablet Take 1 tablet (1,000 mg total) by mouth 2 (two) times daily. FOR DIABETES 180 tablet 1    nabumetone (RELAFEN) 750 MG tablet TAKE 2 TABLETS BY MOUTH EVERY DAY AFTER SUPPER FOR PAIN AND INFLAMMATION 180 tablet 1     OZEMPIC 1 mg/dose (4 mg/3 mL) Inject 1 mg into the skin every 7 days. For BLOOD SUGAR 3 pen 1     No current facility-administered medications for this visit.       Review of patient's allergies indicates:  No Known Allergies    Past Medical History:   Diagnosis Date    Diabetes mellitus, type 2     Hyperlipidemia     Hypertension        History reviewed. No pertinent surgical history.    Family History   Problem Relation Age of Onset    Diabetes Mother        Social History     Socioeconomic History    Marital status:    Tobacco Use    Smoking status: Former Smoker    Smokeless tobacco: Never Used   Substance and Sexual Activity    Alcohol use: Yes     Alcohol/week: 10.0 standard drinks     Types: 10 Cans of beer per week    Drug use: Never    Sexual activity: Yes       Vitals:    01/19/22 1349   BP: 117/64   Pulse: 85   Resp: 20   Temp: 97.5 °F (36.4 °C)       Physical Exam  Constitutional:       General: He is not in acute distress.  HENT:      Head: Normocephalic.   Cardiovascular:      Rate and Rhythm: Normal rate and regular rhythm.      Pulses: Normal pulses.   Pulmonary:      Effort: Pulmonary effort is normal. No respiratory distress.      Breath sounds: Normal breath sounds.   Abdominal:      General: Abdomen is flat. There is no distension.      Palpations: Abdomen is soft.      Tenderness: There is no abdominal tenderness.   Musculoskeletal:         General: Normal range of motion.   Skin:     General: Skin is warm.   Neurological:      General: No focal deficit present.      Mental Status: He is oriented to person, place, and time.         Assessment & Plan:    Screening for malignant neoplasm of colon         Patient to go to the operating room for a colonoscopy. Risks and benefits explained to the patient including risk of bleeding, infection, missed lesion, perforation, and possible need for additional operation. All questions were answered.

## 2022-02-07 DIAGNOSIS — Z12.11 SCREENING FOR MALIGNANT NEOPLASM OF COLON: Primary | ICD-10-CM

## 2022-02-14 ENCOUNTER — LAB VISIT (OUTPATIENT)
Dept: LAB | Facility: HOSPITAL | Age: 57
End: 2022-02-14
Attending: SURGERY
Payer: COMMERCIAL

## 2022-02-14 DIAGNOSIS — Z12.11 SCREENING FOR MALIGNANT NEOPLASM OF COLON: ICD-10-CM

## 2022-02-14 LAB — SARS-COV+SARS-COV-2 AG RESP QL IA.RAPID: NEGATIVE

## 2022-02-14 PROCEDURE — 87426 SARSCOV CORONAVIRUS AG IA: CPT

## 2022-02-16 ENCOUNTER — ANESTHESIA EVENT (OUTPATIENT)
Dept: GASTROENTEROLOGY | Facility: HOSPITAL | Age: 57
End: 2022-02-16
Payer: COMMERCIAL

## 2022-02-16 ENCOUNTER — HOSPITAL ENCOUNTER (OUTPATIENT)
Dept: GASTROENTEROLOGY | Facility: HOSPITAL | Age: 57
Discharge: HOME | End: 2022-02-16
Attending: SURGERY
Payer: COMMERCIAL

## 2022-02-16 ENCOUNTER — ANESTHESIA (OUTPATIENT)
Dept: GASTROENTEROLOGY | Facility: HOSPITAL | Age: 57
End: 2022-02-16
Payer: COMMERCIAL

## 2022-02-16 VITALS
OXYGEN SATURATION: 97 % | BODY MASS INDEX: 47.74 KG/M2 | SYSTOLIC BLOOD PRESSURE: 123 MMHG | WEIGHT: 315 LBS | HEIGHT: 68 IN | RESPIRATION RATE: 18 BRPM | DIASTOLIC BLOOD PRESSURE: 84 MMHG | TEMPERATURE: 97 F | HEART RATE: 83 BPM

## 2022-02-16 DIAGNOSIS — Z12.11 SCREENING FOR MALIGNANT NEOPLASM OF COLON: ICD-10-CM

## 2022-02-16 LAB — GLUCOSE SERPL-MCNC: 113 MG/DL (ref 70–105)

## 2022-02-16 PROCEDURE — D9220A PRA ANESTHESIA: ICD-10-PCS | Mod: ,,, | Performed by: NURSE ANESTHETIST, CERTIFIED REGISTERED

## 2022-02-16 PROCEDURE — 37000008 HC ANESTHESIA 1ST 15 MINUTES

## 2022-02-16 PROCEDURE — 63600175 PHARM REV CODE 636 W HCPCS: Performed by: NURSE ANESTHETIST, CERTIFIED REGISTERED

## 2022-02-16 PROCEDURE — 27000284 HC CANNULA NASAL: Performed by: NURSE ANESTHETIST, CERTIFIED REGISTERED

## 2022-02-16 PROCEDURE — 82962 GLUCOSE BLOOD TEST: CPT

## 2022-02-16 PROCEDURE — G0121 COLON CA SCRN NOT HI RSK IND: ICD-10-PCS | Mod: ,,, | Performed by: SURGERY

## 2022-02-16 PROCEDURE — D9220A PRA ANESTHESIA: Mod: ,,, | Performed by: NURSE ANESTHETIST, CERTIFIED REGISTERED

## 2022-02-16 PROCEDURE — G0121 COLON CA SCRN NOT HI RSK IND: HCPCS | Mod: ,,, | Performed by: SURGERY

## 2022-02-16 PROCEDURE — G0121 COLON CA SCRN NOT HI RSK IND: HCPCS

## 2022-02-16 PROCEDURE — 25000003 PHARM REV CODE 250: Performed by: NURSE ANESTHETIST, CERTIFIED REGISTERED

## 2022-02-16 PROCEDURE — 37000009 HC ANESTHESIA EA ADD 15 MINS

## 2022-02-16 PROCEDURE — 63600175 PHARM REV CODE 636 W HCPCS: Performed by: SURGERY

## 2022-02-16 RX ORDER — PROPOFOL 10 MG/ML
INJECTION, EMULSION INTRAVENOUS
Status: DISCONTINUED | OUTPATIENT
Start: 2022-02-16 | End: 2022-02-16

## 2022-02-16 RX ORDER — SODIUM CHLORIDE, SODIUM LACTATE, POTASSIUM CHLORIDE, CALCIUM CHLORIDE 600; 310; 30; 20 MG/100ML; MG/100ML; MG/100ML; MG/100ML
INJECTION, SOLUTION INTRAVENOUS CONTINUOUS
Status: DISCONTINUED | OUTPATIENT
Start: 2022-02-16 | End: 2022-02-17 | Stop reason: HOSPADM

## 2022-02-16 RX ORDER — LIDOCAINE HYDROCHLORIDE 20 MG/ML
INJECTION, SOLUTION EPIDURAL; INFILTRATION; INTRACAUDAL; PERINEURAL
Status: DISCONTINUED | OUTPATIENT
Start: 2022-02-16 | End: 2022-02-16

## 2022-02-16 RX ADMIN — PROPOFOL 100 MG: 10 INJECTION, EMULSION INTRAVENOUS at 08:02

## 2022-02-16 RX ADMIN — PROPOFOL 50 MG: 10 INJECTION, EMULSION INTRAVENOUS at 08:02

## 2022-02-16 RX ADMIN — SODIUM CHLORIDE, POTASSIUM CHLORIDE, SODIUM LACTATE AND CALCIUM CHLORIDE: 600; 310; 30; 20 INJECTION, SOLUTION INTRAVENOUS at 07:02

## 2022-02-16 RX ADMIN — LIDOCAINE HYDROCHLORIDE 100 MG: 20 INJECTION, SOLUTION EPIDURAL; INFILTRATION; INTRACAUDAL; PERINEURAL at 08:02

## 2022-02-16 NOTE — ANESTHESIA POSTPROCEDURE EVALUATION
Anesthesia Post Evaluation    Patient: Kalpesh Saleem    Procedure(s) Performed: * No procedures listed *    Final Anesthesia Type: general      Patient location during evaluation: GI PACU  Patient participation: Yes- Able to Participate  Level of consciousness: awake and alert  Post-procedure vital signs: reviewed and stable  Pain management: adequate  Airway patency: patent    PONV status at discharge: No PONV  Anesthetic complications: no      Cardiovascular status: blood pressure returned to baseline and hemodynamically stable  Respiratory status: spontaneous ventilation, unassisted and nasal cannula  Hydration status: euvolemic  Follow-up not needed.          Vitals Value Taken Time   /75 02/16/22 0901   Temp 36.1 °C (97 °F) 02/16/22 0901   Pulse 87 02/16/22 0901   Resp 12 02/16/22 0901   SpO2 93 % 02/16/22 0901         No case tracking events are documented in the log.      Pain/Mirza Score: No data recorded

## 2022-02-16 NOTE — TRANSFER OF CARE
"Anesthesia Transfer of Care Note    Patient: Kalpesh Saleem    Procedure(s) Performed: * No procedures listed *    Patient location: GI    Anesthesia Type: general    Transport from OR: Transported from OR on room air with adequate spontaneous ventilation    Post pain: adequate analgesia    Post assessment: no apparent anesthetic complications    Post vital signs: stable    Level of consciousness: responds to stimulation    Nausea/Vomiting: no nausea/vomiting    Complications: none    Transfer of care protocol was followed      Last vitals:   Visit Vitals  /75 (BP Location: Left arm, Patient Position: Lying)   Pulse 87   Temp 36.1 °C (97 °F) (Oral)   Resp 12   Ht 5' 8" (1.727 m)   Wt (!) 146.1 kg (322 lb)   SpO2 (!) 93%   BMI 48.96 kg/m²     "
1

## 2022-02-16 NOTE — ANESTHESIA PREPROCEDURE EVALUATION
02/16/2022  Kalpesh Saleem is a 56 y.o., male.    Anesthesia Evaluation    I have reviewed the Patient Summary Reports.    I have reviewed the Nursing Notes. I have reviewed the NPO Status.   I have reviewed the Medications.     Review of Systems  Anesthesia Hx:  No problems with previous Anesthesia    Cardiovascular:   Hypertension hyperlipidemia    Pulmonary:   Sleep Apnea, CPAP    Endocrine:   Diabetes  Metabolic Disorders, Morbid Obesity / BMI > 40    Past Medical History:   Diagnosis Date    Diabetes mellitus, type 2     Hyperlipidemia     Hypertension     Sleep apnea        Physical Exam  General:  Well nourished, Morbid Obesity    Airway/Jaw/Neck:  Airway Findings: General Airway Assessment: Adult Mallampati: III       Chest/Lungs:  Chest/Lungs Findings: Clear to auscultation     Heart/Vascular:  Heart Findings: Rate: Normal        Mental Status:  Mental Status Findings:  Cooperative, Alert and Oriented         Anesthesia Plan  Type of Anesthesia, risks & benefits discussed:  Anesthesia Type:  general    Patient's Preference:   Plan Factors:          Intra-op Monitoring Plan: standard ASA monitors  Intra-op Monitoring Plan Comments:   Post Op Pain Control Plan: multimodal analgesia  Post Op Pain Control Plan Comments:     Induction:   IV  Beta Blocker:  Patient is not currently on a Beta-Blocker (No further documentation required).       Informed Consent:  Anesthesia consent signed with patient.  ASA Score: 3     Day of Surgery Review of History & Physical: I have interviewed and examined the patient. I have reviewed the patient's H&P dated:  There are no significant changes.          Ready For Surgery From Anesthesia Perspective.       Accu check-112

## 2022-02-16 NOTE — H&P
No change to H and P done less than one month ago. OR C-scope.  Risks and benefits explained all questions answered

## 2022-02-20 DIAGNOSIS — I10 ESSENTIAL HYPERTENSION, BENIGN: ICD-10-CM

## 2022-02-21 RX ORDER — CARVEDILOL 25 MG/1
TABLET ORAL
Qty: 180 TABLET | Refills: 1 | Status: SHIPPED | OUTPATIENT
Start: 2022-02-21 | End: 2022-07-08 | Stop reason: SDUPTHER

## 2022-06-20 ENCOUNTER — OFFICE VISIT (OUTPATIENT)
Dept: FAMILY MEDICINE | Facility: CLINIC | Age: 57
End: 2022-06-20
Payer: COMMERCIAL

## 2022-06-20 VITALS
SYSTOLIC BLOOD PRESSURE: 106 MMHG | BODY MASS INDEX: 46.65 KG/M2 | RESPIRATION RATE: 16 BRPM | OXYGEN SATURATION: 93 % | HEART RATE: 80 BPM | HEIGHT: 69 IN | DIASTOLIC BLOOD PRESSURE: 70 MMHG | WEIGHT: 315 LBS | TEMPERATURE: 98 F

## 2022-06-20 DIAGNOSIS — R06.2 WHEEZING: ICD-10-CM

## 2022-06-20 DIAGNOSIS — E78.2 MIXED HYPERLIPIDEMIA: Chronic | ICD-10-CM

## 2022-06-20 DIAGNOSIS — U07.1 COVID-19: Primary | ICD-10-CM

## 2022-06-20 DIAGNOSIS — J22 LOWER RESPIRATORY TRACT INFECTION: ICD-10-CM

## 2022-06-20 DIAGNOSIS — Z20.822 ENCOUNTER FOR LABORATORY TESTING FOR COVID-19 VIRUS: ICD-10-CM

## 2022-06-20 DIAGNOSIS — E66.01 CLASS 3 SEVERE OBESITY WITH SERIOUS COMORBIDITY AND BODY MASS INDEX (BMI) OF 45.0 TO 49.9 IN ADULT, UNSPECIFIED OBESITY TYPE: Chronic | ICD-10-CM

## 2022-06-20 DIAGNOSIS — I10 ESSENTIAL HYPERTENSION, BENIGN: Chronic | ICD-10-CM

## 2022-06-20 DIAGNOSIS — R68.83 CHILLS: ICD-10-CM

## 2022-06-20 DIAGNOSIS — E11.9 TYPE 2 DIABETES MELLITUS WITHOUT COMPLICATION, WITHOUT LONG-TERM CURRENT USE OF INSULIN: Chronic | ICD-10-CM

## 2022-06-20 LAB
CTP QC/QA: YES
FLUAV AG NPH QL: NEGATIVE
FLUBV AG NPH QL: NEGATIVE
SARS-COV-2 AG RESP QL IA.RAPID: POSITIVE

## 2022-06-20 PROCEDURE — 1160F PR REVIEW ALL MEDS BY PRESCRIBER/CLIN PHARMACIST DOCUMENTED: ICD-10-PCS | Mod: CPTII,,, | Performed by: FAMILY MEDICINE

## 2022-06-20 PROCEDURE — 3078F DIAST BP <80 MM HG: CPT | Mod: CPTII,,, | Performed by: FAMILY MEDICINE

## 2022-06-20 PROCEDURE — 3074F PR MOST RECENT SYSTOLIC BLOOD PRESSURE < 130 MM HG: ICD-10-PCS | Mod: CPTII,,, | Performed by: FAMILY MEDICINE

## 2022-06-20 PROCEDURE — 3044F HG A1C LEVEL LT 7.0%: CPT | Mod: CPTII,,, | Performed by: FAMILY MEDICINE

## 2022-06-20 PROCEDURE — 3044F PR MOST RECENT HEMOGLOBIN A1C LEVEL <7.0%: ICD-10-PCS | Mod: CPTII,,, | Performed by: FAMILY MEDICINE

## 2022-06-20 PROCEDURE — 1159F PR MEDICATION LIST DOCUMENTED IN MEDICAL RECORD: ICD-10-PCS | Mod: CPTII,,, | Performed by: FAMILY MEDICINE

## 2022-06-20 PROCEDURE — 99214 OFFICE O/P EST MOD 30 MIN: CPT | Mod: ,,, | Performed by: FAMILY MEDICINE

## 2022-06-20 PROCEDURE — 3074F SYST BP LT 130 MM HG: CPT | Mod: CPTII,,, | Performed by: FAMILY MEDICINE

## 2022-06-20 PROCEDURE — 1160F RVW MEDS BY RX/DR IN RCRD: CPT | Mod: CPTII,,, | Performed by: FAMILY MEDICINE

## 2022-06-20 PROCEDURE — 99214 PR OFFICE/OUTPT VISIT, EST, LEVL IV, 30-39 MIN: ICD-10-PCS | Mod: ,,, | Performed by: FAMILY MEDICINE

## 2022-06-20 PROCEDURE — 3078F PR MOST RECENT DIASTOLIC BLOOD PRESSURE < 80 MM HG: ICD-10-PCS | Mod: CPTII,,, | Performed by: FAMILY MEDICINE

## 2022-06-20 PROCEDURE — 87428 SARSCOV & INF VIR A&B AG IA: CPT | Mod: QW,,, | Performed by: FAMILY MEDICINE

## 2022-06-20 PROCEDURE — 3008F PR BODY MASS INDEX (BMI) DOCUMENTED: ICD-10-PCS | Mod: CPTII,,, | Performed by: FAMILY MEDICINE

## 2022-06-20 PROCEDURE — 1159F MED LIST DOCD IN RCRD: CPT | Mod: CPTII,,, | Performed by: FAMILY MEDICINE

## 2022-06-20 PROCEDURE — 4010F ACE/ARB THERAPY RXD/TAKEN: CPT | Mod: CPTII,,, | Performed by: FAMILY MEDICINE

## 2022-06-20 PROCEDURE — 3008F BODY MASS INDEX DOCD: CPT | Mod: CPTII,,, | Performed by: FAMILY MEDICINE

## 2022-06-20 PROCEDURE — 87428 POCT SARS-COV2 (COVID) WITH FLU ANTIGEN: ICD-10-PCS | Mod: QW,,, | Performed by: FAMILY MEDICINE

## 2022-06-20 PROCEDURE — 4010F PR ACE/ARB THEARPY RXD/TAKEN: ICD-10-PCS | Mod: CPTII,,, | Performed by: FAMILY MEDICINE

## 2022-06-20 RX ORDER — ALBUTEROL SULFATE 90 UG/1
2 AEROSOL, METERED RESPIRATORY (INHALATION) EVERY 6 HOURS PRN
Qty: 18 G | Refills: 0 | Status: SHIPPED | OUTPATIENT
Start: 2022-06-20 | End: 2022-07-08

## 2022-06-20 RX ORDER — AZITHROMYCIN 250 MG/1
TABLET, FILM COATED ORAL
Qty: 6 TABLET | Refills: 0 | Status: SHIPPED | OUTPATIENT
Start: 2022-06-20 | End: 2022-06-25

## 2022-06-20 RX ORDER — PREDNISONE 20 MG/1
20 TABLET ORAL 2 TIMES DAILY
Qty: 14 TABLET | Refills: 0 | Status: SHIPPED | OUTPATIENT
Start: 2022-06-20 | End: 2022-07-08

## 2022-06-20 RX ORDER — DEXBROMPHENIRAMINE MALEATE, DEXTROMETHORPHAN HBR, PHENYLEPHRINE HCL 2; 15; 7.5 MG/5ML; MG/5ML; MG/5ML
5 LIQUID ORAL 3 TIMES DAILY PRN
Qty: 120 ML | Refills: 0 | Status: SHIPPED | OUTPATIENT
Start: 2022-06-20 | End: 2022-07-08

## 2022-06-20 NOTE — LETTER
June 20, 2022      [facility]  [address] [place] [address]  Phone: [phone]  Fax: [phone]       Patient: Kalpesh Saleem   YOB: 1965  Date of Visit: 06/20/2022    To Whom It May Concern:    Shady Saleem  was at [facility] on 06/20/2022. The patient may return to work/school on 6/27/2022 with no restrictions. If you have any questions or concerns, or if I can be of further assistance, please do not hesitate to contact me.    Sincerely,    Concepcion Dela Cruz LPN

## 2022-06-20 NOTE — ASSESSMENT & PLAN NOTE
High risk due to diabetes, HTN, and obesity    paxlovid sent in, also prednisone and albuterol due to wheezing

## 2022-06-20 NOTE — PROGRESS NOTES
Amol Gonzalez MD    [address] [place] [address]     PATIENT NAME: Kalpesh Saleem  : 1965  DATE: 22  MRN: [id number]      Billing Provider: Amol Gonzalez MD  Level of Service:   Patient PCP Information     Provider PCP Type    Amol Gonzalez MD General          Reason for Visit / Chief Complaint: Sinus Problem, Chills, and Wheezing (X 1 day Positive home COVID test)       Update PCP  Update Chief Complaint         History of Present Illness / Problem Focused Workflow     Kalpesh Saleem presents to the clinic with Sinus Problem, Chills, and Wheezing (X 1 day Positive home COVID test)     Overall he does not feel terrible but does report wheezing and cough, some mild SOB, sinus congestion and chills.     HPI    Review of Systems     Review of Systems   Constitutional: Positive for chills. Negative for activity change, appetite change, fatigue and fever.   HENT: Positive for sinus pressure/congestion. Negative for nasal congestion, ear pain, hearing loss, postnasal drip and sore throat.    Respiratory: Positive for cough and wheezing. Negative for chest tightness and shortness of breath.    Cardiovascular: Negative for chest pain, palpitations, leg swelling and claudication.   Gastrointestinal: Negative for abdominal pain, change in bowel habit, constipation, diarrhea, nausea, vomiting and change in bowel habit.   Genitourinary: Negative for dysuria.   Musculoskeletal: Negative for arthralgias, back pain, gait problem and myalgias.   Integumentary:  Negative for rash.   Neurological: Negative for weakness and headaches.   Psychiatric/Behavioral: Negative for suicidal ideas. The patient is not nervous/anxious.         Medical / Social / Family History     Past Medical History:   Diagnosis Date    Hyperlipidemia     Sleep apnea        History reviewed. No pertinent surgical history.    Social History    reports that he has quit smoking. He  has never used smokeless tobacco. He reports current alcohol use of about 10.0 standard drinks of alcohol per week. He reports that he does not use drugs.    Family History  's family history includes Diabetes in his mother.    Medications and Allergies     Medications  Outpatient Medications Marked as Taking for the 6/20/22 encounter (Office Visit) with Amol Gonzalez MD   Medication Sig Dispense Refill    amLODIPine (NORVASC) 10 MG tablet TAKE 1 TABLET BY MOUTH EVERY DAY FOR BLOOD PRESSURE 90 tablet 1    aspirin 81 MG Chew Take 81 mg by mouth once daily.      atorvastatin (LIPITOR) 40 MG tablet Take 1 tablet (40 mg total) by mouth nightly. For CHOLESTEROL 90 tablet 1    carvediloL (COREG) 25 MG tablet TAKE 1 TABLET BY MOUTH 2 TIMES DAILY. FOR BLOOD PRESSURE 180 tablet 1    hydroCHLOROthiazide (HYDRODIURIL) 12.5 MG Tab Take 1 tablet (12.5 mg total) by mouth once daily. FOR BLOOD PRESSURE 90 tablet 1    lisinopriL (PRINIVIL,ZESTRIL) 40 MG tablet Take 1 tablet (40 mg total) by mouth once daily. FOR BLOOD PRESSURE 90 tablet 1    metFORMIN (GLUCOPHAGE) 1000 MG tablet Take 1 tablet (1,000 mg total) by mouth 2 (two) times daily. FOR DIABETES 180 tablet 1    nabumetone (RELAFEN) 750 MG tablet TAKE 2 TABLETS BY MOUTH EVERY DAY AFTER SUPPER FOR PAIN AND INFLAMMATION 180 tablet 1       Allergies  Review of patient's allergies indicates:  No Known Allergies    Physical Examination     Vitals:    06/20/22 1347   BP: 106/70   Pulse: 80   Resp: 16   Temp: 98.4 °F (36.9 °C)     Physical Exam  Vitals and nursing note reviewed.   Constitutional:       General: He is not in acute distress.     Appearance: Normal appearance. He is not ill-appearing.   Eyes:      Extraocular Movements: Extraocular movements intact.      Pupils: Pupils are equal, round, and reactive to light.   Pulmonary:      Effort: Pulmonary effort is normal.   Musculoskeletal:         General: Normal range of motion.   Skin:     Findings: No rash.    Neurological:      General: No focal deficit present.      Mental Status: He is alert and oriented to person, place, and time. Mental status is at baseline.   Psychiatric:         Mood and Affect: Mood normal.         Behavior: Behavior normal.          Assessment and Plan (including Health Maintenance)      Problem List  Smart Sets  Document Outside HM   :    Plan:         Health Maintenance Due   Topic Date Due    Hepatitis C Screening  Never done    Foot Exam  Never done    Eye Exam  Never done    COVID-19 Vaccine (3 - Booster for Moderna series) 02/02/2022    Diabetes Urine Screening  07/14/2022       Problem List Items Addressed This Visit        Cardiac/Vascular    Essential hypertension, benign (Chronic)    Hyperlipidemia (Chronic)       ID    COVID-19 - Primary    Current Assessment & Plan     High risk due to diabetes, HTN, and obesity    paxlovid sent in, also prednisone and albuterol due to wheezing            Relevant Medications    nirmatrelvir-ritonavir 150 mg x 2- 100 mg copackaged tablets (EUA)       Endocrine    Type 2 diabetes mellitus without complication, without long-term current use of insulin (Chronic)    Class 3 severe obesity with serious comorbidity and body mass index (BMI) of 45.0 to 49.9 in adult (Chronic)      Other Visit Diagnoses     Encounter for laboratory testing for COVID-19 virus        Relevant Orders    POCT SARS-COV2 (COVID) with Flu Antigen (Completed)    Chills        Relevant Orders    POCT SARS-COV2 (COVID) with Flu Antigen (Completed)    Wheezing        Relevant Orders    POCT SARS-COV2 (COVID) with Flu Antigen (Completed)    Lower respiratory tract infection        Relevant Medications    predniSONE (DELTASONE) 20 MG tablet    dexbrompheniramine-phenylep-DM (POLYTUSSIN DM,DEXBROMPHENIRMN,) 2-7.5-15 mg/5 mL Liqd    albuterol (VENTOLIN HFA) 90 mcg/actuation inhaler    azithromycin (Z-INDY) 250 MG tablet        COVID-19  -     nirmatrelvir-ritonavir 150 mg x 2- 100 mg  copackaged tablets (EUA); Take 3 tablets by mouth 2 (two) times daily for 5 days. Each dose contains 2 nirmatrelvir (pink tablets) and 1 ritonavir (white tablet). Take all 3 tablets together  Dispense: 30 tablet; Refill: 0    Encounter for laboratory testing for COVID-19 virus  -     POCT SARS-COV2 (COVID) with Flu Antigen    Chills  -     POCT SARS-COV2 (COVID) with Flu Antigen    Wheezing  -     POCT SARS-COV2 (COVID) with Flu Antigen    Lower respiratory tract infection  -     predniSONE (DELTASONE) 20 MG tablet; Take 1 tablet (20 mg total) by mouth 2 (two) times daily.  Dispense: 14 tablet; Refill: 0  -     dexbrompheniramine-phenylep-DM (POLYTUSSIN DM,DEXBROMPHENIRMN,) 2-7.5-15 mg/5 mL Liqd; Take 5 mLs by mouth 3 (three) times daily as needed (for COUGH and CONGESTION).  Dispense: 120 mL; Refill: 0  -     albuterol (VENTOLIN HFA) 90 mcg/actuation inhaler; Inhale 2 puffs into the lungs every 6 (six) hours as needed for Wheezing. Rescue  Dispense: 18 g; Refill: 0  -     azithromycin (Z-INDY) 250 MG tablet; Take 2 tablets by mouth on day 1; Take 1 tablet by mouth on days 2-5  Dispense: 6 tablet; Refill: 0    Type 2 diabetes mellitus without complication, without long-term current use of insulin    Essential hypertension, benign    Class 3 severe obesity with serious comorbidity and body mass index (BMI) of 45.0 to 49.9 in adult, unspecified obesity type    Mixed hyperlipidemia       Health Maintenance Topics with due status: Not Due       Topic Last Completion Date    Lipid Panel 07/14/2021    Hemoglobin A1c 01/14/2022    Colorectal Cancer Screening 02/16/2022    Low Dose Statin 06/20/2022    Influenza Vaccine Not Due       Procedures     Future Appointments   Date Time Provider Department Center   7/8/2022  8:15 AM Amol Gonzalez MD [facility]        Follow up if symptoms worsen or fail to improve.     Signature:  Amol Gonzalez MD  [facility]  [address] [place] [address]     [place] [id number]  Phone #: [phone]  Fax #: [phone]    Date of encounter: 6/20/22    There are no Patient Instructions on file for this visit.

## 2022-07-08 ENCOUNTER — OFFICE VISIT (OUTPATIENT)
Dept: FAMILY MEDICINE | Facility: CLINIC | Age: 57
End: 2022-07-08
Payer: COMMERCIAL

## 2022-07-08 VITALS
RESPIRATION RATE: 16 BRPM | DIASTOLIC BLOOD PRESSURE: 89 MMHG | SYSTOLIC BLOOD PRESSURE: 134 MMHG | WEIGHT: 315 LBS | OXYGEN SATURATION: 96 % | BODY MASS INDEX: 47.74 KG/M2 | TEMPERATURE: 98 F | HEART RATE: 66 BPM | HEIGHT: 68 IN

## 2022-07-08 DIAGNOSIS — Z11.59 ENCOUNTER FOR HEPATITIS C SCREENING TEST FOR LOW RISK PATIENT: ICD-10-CM

## 2022-07-08 DIAGNOSIS — M19.90 INFLAMMATION OF JOINT: Chronic | ICD-10-CM

## 2022-07-08 DIAGNOSIS — E11.9 TYPE 2 DIABETES MELLITUS WITHOUT COMPLICATION, WITHOUT LONG-TERM CURRENT USE OF INSULIN: Primary | Chronic | ICD-10-CM

## 2022-07-08 DIAGNOSIS — E66.01 CLASS 3 SEVERE OBESITY WITH SERIOUS COMORBIDITY AND BODY MASS INDEX (BMI) OF 50.0 TO 59.9 IN ADULT, UNSPECIFIED OBESITY TYPE: Chronic | ICD-10-CM

## 2022-07-08 DIAGNOSIS — I10 ESSENTIAL HYPERTENSION, BENIGN: Chronic | ICD-10-CM

## 2022-07-08 DIAGNOSIS — E78.5 HYPERLIPIDEMIA, UNSPECIFIED HYPERLIPIDEMIA TYPE: Chronic | ICD-10-CM

## 2022-07-08 LAB
ANION GAP SERPL CALCULATED.3IONS-SCNC: 12 MMOL/L (ref 7–16)
BASOPHILS # BLD AUTO: 0.06 K/UL (ref 0–0.2)
BASOPHILS NFR BLD AUTO: 0.9 % (ref 0–1)
BILIRUB UR QL STRIP: NEGATIVE
BUN SERPL-MCNC: 14 MG/DL (ref 7–18)
BUN/CREAT SERPL: 17 (ref 6–20)
CALCIUM SERPL-MCNC: 9.1 MG/DL (ref 8.5–10.1)
CHLORIDE SERPL-SCNC: 105 MMOL/L (ref 98–107)
CHOLEST SERPL-MCNC: 158 MG/DL (ref 0–200)
CHOLEST/HDLC SERPL: 4.3 {RATIO}
CLARITY UR: CLEAR
CO2 SERPL-SCNC: 30 MMOL/L (ref 21–32)
COLOR UR: YELLOW
CREAT SERPL-MCNC: 0.81 MG/DL (ref 0.7–1.3)
CREAT UR-MCNC: 60 MG/DL (ref 39–259)
DIFFERENTIAL METHOD BLD: ABNORMAL
EOSINOPHIL # BLD AUTO: 0.42 K/UL (ref 0–0.5)
EOSINOPHIL NFR BLD AUTO: 6.3 % (ref 1–4)
ERYTHROCYTE [DISTWIDTH] IN BLOOD BY AUTOMATED COUNT: 14.1 % (ref 11.5–14.5)
EST. AVERAGE GLUCOSE BLD GHB EST-MCNC: 124 MG/DL
GLUCOSE SERPL-MCNC: 125 MG/DL (ref 74–106)
GLUCOSE UR STRIP-MCNC: NEGATIVE MG/DL
HBA1C MFR BLD HPLC: 6.3 % (ref 4.5–6.6)
HCT VFR BLD AUTO: 39 % (ref 40–54)
HCV AB SER QL: NORMAL
HDLC SERPL-MCNC: 37 MG/DL (ref 40–60)
HGB BLD-MCNC: 12.9 G/DL (ref 13.5–18)
IMM GRANULOCYTES # BLD AUTO: 0.03 K/UL (ref 0–0.04)
IMM GRANULOCYTES NFR BLD: 0.4 % (ref 0–0.4)
KETONES UR STRIP-SCNC: NEGATIVE MG/DL
LDLC SERPL CALC-MCNC: 88 MG/DL
LDLC/HDLC SERPL: 2.4 {RATIO}
LEUKOCYTE ESTERASE UR QL STRIP: NEGATIVE
LYMPHOCYTES # BLD AUTO: 2.04 K/UL (ref 1–4.8)
LYMPHOCYTES NFR BLD AUTO: 30.4 % (ref 27–41)
MCH RBC QN AUTO: 29.8 PG (ref 27–31)
MCHC RBC AUTO-ENTMCNC: 33.1 G/DL (ref 32–36)
MCV RBC AUTO: 90.1 FL (ref 80–96)
MICROALBUMIN UR-MCNC: <0.5 MG/DL (ref 0–2.8)
MICROALBUMIN/CREAT RATIO PNL UR: <8.3 MG/G (ref 0–30)
MONOCYTES # BLD AUTO: 0.5 K/UL (ref 0–0.8)
MONOCYTES NFR BLD AUTO: 7.5 % (ref 2–6)
MPC BLD CALC-MCNC: 12.1 FL (ref 9.4–12.4)
NEUTROPHILS # BLD AUTO: 3.66 K/UL (ref 1.8–7.7)
NEUTROPHILS NFR BLD AUTO: 54.5 % (ref 53–65)
NITRITE UR QL STRIP: NEGATIVE
NONHDLC SERPL-MCNC: 121 MG/DL
NRBC # BLD AUTO: 0 X10E3/UL
NRBC, AUTO (.00): 0 %
PH UR STRIP: 6 PH UNITS
PLATELET # BLD AUTO: 223 K/UL (ref 150–400)
POTASSIUM SERPL-SCNC: 4.4 MMOL/L (ref 3.5–5.1)
PROT UR QL STRIP: NEGATIVE
RBC # BLD AUTO: 4.33 M/UL (ref 4.6–6.2)
RBC # UR STRIP: NEGATIVE /UL
SODIUM SERPL-SCNC: 143 MMOL/L (ref 136–145)
SP GR UR STRIP: 1.01
TRIGL SERPL-MCNC: 165 MG/DL (ref 35–150)
UROBILINOGEN UR STRIP-ACNC: 0.2 MG/DL
VLDLC SERPL-MCNC: 33 MG/DL
WBC # BLD AUTO: 6.71 K/UL (ref 4.5–11)

## 2022-07-08 PROCEDURE — 99214 PR OFFICE/OUTPT VISIT, EST, LEVL IV, 30-39 MIN: ICD-10-PCS | Mod: ,,, | Performed by: FAMILY MEDICINE

## 2022-07-08 PROCEDURE — 3008F PR BODY MASS INDEX (BMI) DOCUMENTED: ICD-10-PCS | Mod: CPTII,,, | Performed by: FAMILY MEDICINE

## 2022-07-08 PROCEDURE — 82043 MICROALBUMIN / CREATININE RATIO URINE: ICD-10-PCS | Mod: ,,, | Performed by: CLINICAL MEDICAL LABORATORY

## 2022-07-08 PROCEDURE — 80061 LIPID PANEL: CPT | Mod: ,,, | Performed by: CLINICAL MEDICAL LABORATORY

## 2022-07-08 PROCEDURE — 85025 CBC WITH DIFFERENTIAL: ICD-10-PCS | Mod: ,,, | Performed by: CLINICAL MEDICAL LABORATORY

## 2022-07-08 PROCEDURE — 80061 LIPID PANEL: ICD-10-PCS | Mod: ,,, | Performed by: CLINICAL MEDICAL LABORATORY

## 2022-07-08 PROCEDURE — 81003 URINALYSIS, REFLEX TO URINE CULTURE: ICD-10-PCS | Mod: QW,,, | Performed by: CLINICAL MEDICAL LABORATORY

## 2022-07-08 PROCEDURE — 3079F DIAST BP 80-89 MM HG: CPT | Mod: CPTII,,, | Performed by: FAMILY MEDICINE

## 2022-07-08 PROCEDURE — 85025 COMPLETE CBC W/AUTO DIFF WBC: CPT | Mod: ,,, | Performed by: CLINICAL MEDICAL LABORATORY

## 2022-07-08 PROCEDURE — 3079F PR MOST RECENT DIASTOLIC BLOOD PRESSURE 80-89 MM HG: ICD-10-PCS | Mod: CPTII,,, | Performed by: FAMILY MEDICINE

## 2022-07-08 PROCEDURE — 81003 URINALYSIS AUTO W/O SCOPE: CPT | Mod: QW,,, | Performed by: CLINICAL MEDICAL LABORATORY

## 2022-07-08 PROCEDURE — 3075F SYST BP GE 130 - 139MM HG: CPT | Mod: CPTII,,, | Performed by: FAMILY MEDICINE

## 2022-07-08 PROCEDURE — 1160F RVW MEDS BY RX/DR IN RCRD: CPT | Mod: CPTII,,, | Performed by: FAMILY MEDICINE

## 2022-07-08 PROCEDURE — 1160F PR REVIEW ALL MEDS BY PRESCRIBER/CLIN PHARMACIST DOCUMENTED: ICD-10-PCS | Mod: CPTII,,, | Performed by: FAMILY MEDICINE

## 2022-07-08 PROCEDURE — 1159F MED LIST DOCD IN RCRD: CPT | Mod: CPTII,,, | Performed by: FAMILY MEDICINE

## 2022-07-08 PROCEDURE — 83036 HEMOGLOBIN GLYCOSYLATED A1C: CPT | Mod: ,,, | Performed by: CLINICAL MEDICAL LABORATORY

## 2022-07-08 PROCEDURE — 82043 UR ALBUMIN QUANTITATIVE: CPT | Mod: ,,, | Performed by: CLINICAL MEDICAL LABORATORY

## 2022-07-08 PROCEDURE — 3008F BODY MASS INDEX DOCD: CPT | Mod: CPTII,,, | Performed by: FAMILY MEDICINE

## 2022-07-08 PROCEDURE — 82570 MICROALBUMIN / CREATININE RATIO URINE: ICD-10-PCS | Mod: ,,, | Performed by: CLINICAL MEDICAL LABORATORY

## 2022-07-08 PROCEDURE — 80048 BASIC METABOLIC PNL TOTAL CA: CPT | Mod: ,,, | Performed by: CLINICAL MEDICAL LABORATORY

## 2022-07-08 PROCEDURE — 3044F HG A1C LEVEL LT 7.0%: CPT | Mod: CPTII,,, | Performed by: FAMILY MEDICINE

## 2022-07-08 PROCEDURE — 1159F PR MEDICATION LIST DOCUMENTED IN MEDICAL RECORD: ICD-10-PCS | Mod: CPTII,,, | Performed by: FAMILY MEDICINE

## 2022-07-08 PROCEDURE — 4010F ACE/ARB THERAPY RXD/TAKEN: CPT | Mod: CPTII,,, | Performed by: FAMILY MEDICINE

## 2022-07-08 PROCEDURE — 99214 OFFICE O/P EST MOD 30 MIN: CPT | Mod: ,,, | Performed by: FAMILY MEDICINE

## 2022-07-08 PROCEDURE — 80048 BASIC METABOLIC PANEL: ICD-10-PCS | Mod: ,,, | Performed by: CLINICAL MEDICAL LABORATORY

## 2022-07-08 PROCEDURE — 86803 HEPATITIS C ANTIBODY: ICD-10-PCS | Mod: ,,, | Performed by: CLINICAL MEDICAL LABORATORY

## 2022-07-08 PROCEDURE — 83036 HEMOGLOBIN A1C: ICD-10-PCS | Mod: ,,, | Performed by: CLINICAL MEDICAL LABORATORY

## 2022-07-08 PROCEDURE — 3075F PR MOST RECENT SYSTOLIC BLOOD PRESS GE 130-139MM HG: ICD-10-PCS | Mod: CPTII,,, | Performed by: FAMILY MEDICINE

## 2022-07-08 PROCEDURE — 4010F PR ACE/ARB THEARPY RXD/TAKEN: ICD-10-PCS | Mod: CPTII,,, | Performed by: FAMILY MEDICINE

## 2022-07-08 PROCEDURE — 82570 ASSAY OF URINE CREATININE: CPT | Mod: ,,, | Performed by: CLINICAL MEDICAL LABORATORY

## 2022-07-08 PROCEDURE — 3044F PR MOST RECENT HEMOGLOBIN A1C LEVEL <7.0%: ICD-10-PCS | Mod: CPTII,,, | Performed by: FAMILY MEDICINE

## 2022-07-08 PROCEDURE — 86803 HEPATITIS C AB TEST: CPT | Mod: ,,, | Performed by: CLINICAL MEDICAL LABORATORY

## 2022-07-08 RX ORDER — LISINOPRIL 40 MG/1
40 TABLET ORAL DAILY
Qty: 90 TABLET | Refills: 1 | Status: SHIPPED | OUTPATIENT
Start: 2022-07-08 | End: 2022-12-28 | Stop reason: SDUPTHER

## 2022-07-08 RX ORDER — METFORMIN HYDROCHLORIDE 1000 MG/1
1000 TABLET ORAL 2 TIMES DAILY
Qty: 180 TABLET | Refills: 1 | Status: SHIPPED | OUTPATIENT
Start: 2022-07-08 | End: 2022-12-28 | Stop reason: SDUPTHER

## 2022-07-08 RX ORDER — AMLODIPINE BESYLATE 10 MG/1
TABLET ORAL
Qty: 90 TABLET | Refills: 1 | Status: SHIPPED | OUTPATIENT
Start: 2022-07-08 | End: 2022-12-28 | Stop reason: SDUPTHER

## 2022-07-08 RX ORDER — HYDROCHLOROTHIAZIDE 12.5 MG/1
12.5 TABLET ORAL DAILY
Qty: 90 TABLET | Refills: 1 | Status: SHIPPED | OUTPATIENT
Start: 2022-07-08 | End: 2022-12-28 | Stop reason: SDUPTHER

## 2022-07-08 RX ORDER — CARVEDILOL 25 MG/1
TABLET ORAL
Qty: 180 TABLET | Refills: 1 | Status: SHIPPED | OUTPATIENT
Start: 2022-07-08 | End: 2022-12-28 | Stop reason: SDUPTHER

## 2022-07-08 RX ORDER — NABUMETONE 750 MG/1
TABLET, FILM COATED ORAL
Qty: 180 TABLET | Refills: 1 | Status: SHIPPED | OUTPATIENT
Start: 2022-07-08 | End: 2022-12-28 | Stop reason: SDUPTHER

## 2022-07-08 RX ORDER — ATORVASTATIN CALCIUM 40 MG/1
40 TABLET, FILM COATED ORAL NIGHTLY
Qty: 90 TABLET | Refills: 1 | Status: SHIPPED | OUTPATIENT
Start: 2022-07-08 | End: 2022-12-28 | Stop reason: SDUPTHER

## 2022-07-08 RX ORDER — TIRZEPATIDE 5 MG/.5ML
5 INJECTION, SOLUTION SUBCUTANEOUS
Qty: 2 ML | Refills: 2 | Status: SHIPPED | OUTPATIENT
Start: 2022-07-08 | End: 2022-10-10

## 2022-07-08 NOTE — PROGRESS NOTES
Amol Gonzalez MD    [address] [place] [address]     PATIENT NAME: Kalpesh Saleem  : 1965  DATE: 22  MRN: [id number]      Billing Provider: Amol Gonzalez MD  Level of Service:   Patient PCP Information     Provider PCP Type    Amol Gonzalez MD General          Reason for Visit / Chief Complaint: Diabetes and Hypertension (6 month follow up)       Update PCP  Update Chief Complaint         History of Present Illness / Problem Focused Workflow     Kalpesh Saleem presents to the clinic with Diabetes and Hypertension (6 month follow up)     HTN - blood pressure at home has been a little elevated, in clinic today the number was initially elevated but did go down with rest. He did not take his medication for the past week.     Diabetes - generally well controlled, ran out of Ozempic about 3 months ago, it was helping some with diabetes, but not much with appetite control, he has gained weight since stopping it.       HPI    Review of Systems     Review of Systems   Constitutional: Negative for activity change, appetite change, chills, fatigue, fever and unexpected weight change.   HENT: Negative for nasal congestion, ear pain, hearing loss, postnasal drip, rhinorrhea, sore throat and trouble swallowing.    Eyes: Negative for discharge and visual disturbance.   Respiratory: Negative for cough, chest tightness, shortness of breath and wheezing.    Cardiovascular: Negative for chest pain, palpitations, leg swelling and claudication.   Gastrointestinal: Negative for abdominal pain, blood in stool, change in bowel habit, constipation, diarrhea, nausea, vomiting and change in bowel habit.   Endocrine: Negative for polydipsia and polyuria.   Genitourinary: Negative for difficulty urinating, dysuria, hematuria and urgency.   Musculoskeletal: Negative for arthralgias, back pain, gait problem, joint swelling, myalgias and neck pain.   Integumentary:   Negative for rash.   Neurological: Negative for weakness and headaches.   Psychiatric/Behavioral: Negative for confusion, dysphoric mood and suicidal ideas. The patient is not nervous/anxious.         Medical / Social / Family History     Past Medical History:   Diagnosis Date    Hyperlipidemia     Sleep apnea        History reviewed. No pertinent surgical history.    Social History    reports that he has quit smoking. He has never used smokeless tobacco. He reports current alcohol use of about 10.0 standard drinks of alcohol per week. He reports that he does not use drugs.    Family History  's family history includes Diabetes in his mother.    Medications and Allergies     Medications  Outpatient Medications Marked as Taking for the 7/8/22 encounter (Office Visit) with Amlo Gonzalez MD   Medication Sig Dispense Refill    aspirin 81 MG Chew Take 81 mg by mouth once daily.      [DISCONTINUED] amLODIPine (NORVASC) 10 MG tablet TAKE 1 TABLET BY MOUTH EVERY DAY FOR BLOOD PRESSURE 90 tablet 1    [DISCONTINUED] atorvastatin (LIPITOR) 40 MG tablet Take 1 tablet (40 mg total) by mouth nightly. For CHOLESTEROL 90 tablet 1    [DISCONTINUED] carvediloL (COREG) 25 MG tablet TAKE 1 TABLET BY MOUTH 2 TIMES DAILY. FOR BLOOD PRESSURE 180 tablet 1    [DISCONTINUED] hydroCHLOROthiazide (HYDRODIURIL) 12.5 MG Tab Take 1 tablet (12.5 mg total) by mouth once daily. FOR BLOOD PRESSURE 90 tablet 1    [DISCONTINUED] lisinopriL (PRINIVIL,ZESTRIL) 40 MG tablet Take 1 tablet (40 mg total) by mouth once daily. FOR BLOOD PRESSURE 90 tablet 1    [DISCONTINUED] metFORMIN (GLUCOPHAGE) 1000 MG tablet Take 1 tablet (1,000 mg total) by mouth 2 (two) times daily. FOR DIABETES 180 tablet 1    [DISCONTINUED] nabumetone (RELAFEN) 750 MG tablet TAKE 2 TABLETS BY MOUTH EVERY DAY AFTER SUPPER FOR PAIN AND INFLAMMATION 180 tablet 1       Allergies  Review of patient's allergies indicates:  No Known Allergies    Physical Examination      Vitals:    07/08/22 0805   BP: 134/89   Pulse: 66   Resp: 16   Temp: 98 °F (36.7 °C)     Physical Exam  Vitals and nursing note reviewed.   Constitutional:       General: He is not in acute distress.     Appearance: Normal appearance. He is not ill-appearing.   Eyes:      Extraocular Movements: Extraocular movements intact.      Pupils: Pupils are equal, round, and reactive to light.   Cardiovascular:      Rate and Rhythm: Normal rate and regular rhythm.      Heart sounds: Normal heart sounds.   Pulmonary:      Effort: Pulmonary effort is normal.      Breath sounds: Normal breath sounds.   Abdominal:      General: Bowel sounds are normal.      Palpations: Abdomen is soft.   Musculoskeletal:         General: Normal range of motion.   Skin:     Findings: No rash.   Neurological:      General: No focal deficit present.      Mental Status: He is alert and oriented to person, place, and time. Mental status is at baseline.   Psychiatric:         Mood and Affect: Mood normal.         Behavior: Behavior normal.          Assessment and Plan (including Health Maintenance)      Problem List  Smart Tealet  Document Outside HM   :    Plan:         Health Maintenance Due   Topic Date Due    Hepatitis C Screening  Never done    Foot Exam  Never done    Eye Exam  Never done    COVID-19 Vaccine (3 - Booster for Moderna series) 02/02/2022    Diabetes Urine Screening  07/14/2022    Hemoglobin A1c  07/14/2022       Problem List Items Addressed This Visit        Cardiac/Vascular    Essential hypertension, benign (Chronic)    Current Assessment & Plan      - Goal blood pressure for most patients is less than 130/85. Both numbers are important. If you have questions about your specific goal blood pressure, please ask at your clinic visits.   - Check blood pressure outside of clinic, record the numbers, and bring the log to all your office visits.   - If you have any chest pain, SOB, or other concerning symptoms, report these  immediately, or go to the nearest ER.    - Recommend cardiovascular exercise, at least 3 times per week, for at least 15 minutes.   - Eat a heart healthy diet.              Relevant Medications    lisinopriL (PRINIVIL,ZESTRIL) 40 MG tablet    hydroCHLOROthiazide (HYDRODIURIL) 12.5 MG Tab    amLODIPine (NORVASC) 10 MG tablet    carvediloL (COREG) 25 MG tablet    Other Relevant Orders    Basic Metabolic Panel    CBC Auto Differential    Microalbumin/Creatinine Ratio, Urine    Urinalysis, Reflex to Urine Culture    Hyperlipidemia (Chronic)    Relevant Medications    atorvastatin (LIPITOR) 40 MG tablet    Other Relevant Orders    Lipid Panel       Endocrine    Type 2 diabetes mellitus without complication, without long-term current use of insulin - Primary (Chronic)    Current Assessment & Plan      - Check glucose outside of clinic, record numbers, and bring log to follow up visit.    - Take medications as directed, and bring all medications to every clinic appointment.    - Eat a diabetic diet, if there are concerns about what that entails, we have a diabetic educator in our clinic.    - Cardiovascular exercise at least 3 times per week, for at least 15 minutes.    - Patient should be on a Statin medication, unless patient cannot tolerate a Statin.    - Aspirin should be taken everyday,  81mg, unless a higher dose is indicated for other medicaiton conditions. Also do not recommend Aspirin for a patient with known adverse effects from Aspirin.    - Recommend yearly, dilated eye exams for all Diabetic Patients.    - Monitor feet for calluses, abrasion, or other abnormalities. Report any concerns at every clinic visit.    -   Hemoglobin A1C   Date Value Ref Range Status   01/14/2022 6.1 4.5 - 6.6 % Final     Comment:       Normal:               <5.7%  Pre-Diabetic:       5.7% to 6.4%  Diabetic:             >6.4%  Diabetic Goal:     <7%   07/14/2021 5.8 4.5 - 6.6 % Final     Comment:       Normal:                <5.7%  Pre-Diabetic:       5.7% to 6.4%  Diabetic:             >6.4%  Diabetic Goal:     <7%      He done decent on Ozempic, but states it was no longer helping him when he stopped taking it a few months ago. He asks for help with diabetes and weight loss. We prescribed Mounjaro.            Relevant Medications    metFORMIN (GLUCOPHAGE) 1000 MG tablet    tirzepatide (MOUNJARO) 5 mg/0.5 mL PnIj    Other Relevant Orders    Hemoglobin A1C    Class 3 severe obesity with serious comorbidity and body mass index (BMI) of 50.0 to 59.9 in adult (Chronic)    Current Assessment & Plan     mounjaro for diabetes should help with weight loss and appetite control               Orthopedic    Inflammation of joint    Relevant Medications    nabumetone (RELAFEN) 750 MG tablet      Other Visit Diagnoses     Encounter for hepatitis C screening test for low risk patient        Relevant Orders    Hepatitis C Antibody        Type 2 diabetes mellitus without complication, without long-term current use of insulin  -     metFORMIN (GLUCOPHAGE) 1000 MG tablet; Take 1 tablet (1,000 mg total) by mouth 2 (two) times daily. FOR DIABETES  Dispense: 180 tablet; Refill: 1  -     Hemoglobin A1C; Future; Expected date: 07/08/2022  -     tirzepatide (MOUNJARO) 5 mg/0.5 mL PnIj; Inject 5 mg into the skin every 7 days. For DIABETES  Dispense: 2 mL; Refill: 2    Hyperlipidemia, unspecified hyperlipidemia type  -     atorvastatin (LIPITOR) 40 MG tablet; Take 1 tablet (40 mg total) by mouth nightly. For CHOLESTEROL  Dispense: 90 tablet; Refill: 1  -     Lipid Panel; Future; Expected date: 07/08/2022    Essential hypertension, benign  -     lisinopriL (PRINIVIL,ZESTRIL) 40 MG tablet; Take 1 tablet (40 mg total) by mouth once daily. FOR BLOOD PRESSURE  Dispense: 90 tablet; Refill: 1  -     hydroCHLOROthiazide (HYDRODIURIL) 12.5 MG Tab; Take 1 tablet (12.5 mg total) by mouth once daily. FOR BLOOD PRESSURE  Dispense: 90 tablet; Refill: 1  -     amLODIPine  (NORVASC) 10 MG tablet; TAKE 1 TABLET BY MOUTH EVERY DAY FOR BLOOD PRESSURE  Dispense: 90 tablet; Refill: 1  -     carvediloL (COREG) 25 MG tablet; TAKE 1 TABLET BY MOUTH 2 TIMES DAILY. FOR BLOOD PRESSURE  Dispense: 180 tablet; Refill: 1  -     Basic Metabolic Panel; Future; Expected date: 07/08/2022  -     CBC Auto Differential; Future; Expected date: 07/08/2022  -     Microalbumin/Creatinine Ratio, Urine; Future; Expected date: 07/08/2022  -     Urinalysis, Reflex to Urine Culture; Future; Expected date: 07/08/2022    Inflammation of joint  -     nabumetone (RELAFEN) 750 MG tablet; TAKE 2 TABLETS BY MOUTH EVERY DAY AFTER SUPPER FOR PAIN AND INFLAMMATION  Dispense: 180 tablet; Refill: 1    Encounter for hepatitis C screening test for low risk patient  -     Hepatitis C Antibody; Future; Expected date: 07/08/2022    Class 3 severe obesity with serious comorbidity and body mass index (BMI) of 50.0 to 59.9 in adult, unspecified obesity type       Health Maintenance Topics with due status: Not Due       Topic Last Completion Date    Lipid Panel 07/14/2021    Colorectal Cancer Screening 02/16/2022    Low Dose Statin 07/08/2022    Influenza Vaccine Not Due       Procedures     Future Appointments   Date Time Provider Department Center   1/13/2023  8:00 AM Amol Gonzalez MD [facility]        Follow up in about 6 months (around 1/8/2023) for chronic health problems, hypertension, diabetes.     Signature:  Amol Gonzalez MD  [address] [place] [address]  Phone #: [phone]  Fax #: [phone]    Date of encounter: 7/8/22    There are no Patient Instructions on file for this visit.

## 2022-07-08 NOTE — ASSESSMENT & PLAN NOTE
- Check glucose outside of clinic, record numbers, and bring log to follow up visit.    - Take medications as directed, and bring all medications to every clinic appointment.    - Eat a diabetic diet, if there are concerns about what that entails, we have a diabetic educator in our clinic.    - Cardiovascular exercise at least 3 times per week, for at least 15 minutes.    - Patient should be on a Statin medication, unless patient cannot tolerate a Statin.    - Aspirin should be taken everyday,  81mg, unless a higher dose is indicated for other medicaiton conditions. Also do not recommend Aspirin for a patient with known adverse effects from Aspirin.    - Recommend yearly, dilated eye exams for all Diabetic Patients.    - Monitor feet for calluses, abrasion, or other abnormalities. Report any concerns at every clinic visit.    -   Hemoglobin A1C   Date Value Ref Range Status   01/14/2022 6.1 4.5 - 6.6 % Final     Comment:       Normal:               <5.7%  Pre-Diabetic:       5.7% to 6.4%  Diabetic:             >6.4%  Diabetic Goal:     <7%   07/14/2021 5.8 4.5 - 6.6 % Final     Comment:       Normal:               <5.7%  Pre-Diabetic:       5.7% to 6.4%  Diabetic:             >6.4%  Diabetic Goal:     <7%      He done decent on Ozempic, but states it was no longer helping him when he stopped taking it a few months ago. He asks for help with diabetes and weight loss. We prescribed Mounjaro.

## 2022-12-28 ENCOUNTER — OFFICE VISIT (OUTPATIENT)
Dept: FAMILY MEDICINE | Facility: CLINIC | Age: 57
End: 2022-12-28
Payer: COMMERCIAL

## 2022-12-28 VITALS
BODY MASS INDEX: 47.74 KG/M2 | OXYGEN SATURATION: 96 % | RESPIRATION RATE: 18 BRPM | WEIGHT: 315 LBS | SYSTOLIC BLOOD PRESSURE: 112 MMHG | DIASTOLIC BLOOD PRESSURE: 80 MMHG | HEART RATE: 66 BPM | HEIGHT: 68 IN | TEMPERATURE: 99 F

## 2022-12-28 DIAGNOSIS — E78.5 HYPERLIPIDEMIA, UNSPECIFIED HYPERLIPIDEMIA TYPE: Chronic | ICD-10-CM

## 2022-12-28 DIAGNOSIS — M19.90 INFLAMMATION OF JOINT: Chronic | ICD-10-CM

## 2022-12-28 DIAGNOSIS — E11.9 TYPE 2 DIABETES MELLITUS WITHOUT COMPLICATION, WITHOUT LONG-TERM CURRENT USE OF INSULIN: Primary | Chronic | ICD-10-CM

## 2022-12-28 DIAGNOSIS — I10 ESSENTIAL HYPERTENSION, BENIGN: Chronic | ICD-10-CM

## 2022-12-28 DIAGNOSIS — Z20.822 ENCOUNTER FOR LABORATORY TESTING FOR COVID-19 VIRUS: ICD-10-CM

## 2022-12-28 LAB
ALBUMIN SERPL BCP-MCNC: 3.7 G/DL (ref 3.5–5)
ALBUMIN/GLOB SERPL: 1 {RATIO}
ALP SERPL-CCNC: 92 U/L (ref 45–115)
ALT SERPL W P-5'-P-CCNC: 29 U/L (ref 16–61)
ANION GAP SERPL CALCULATED.3IONS-SCNC: 8 MMOL/L (ref 7–16)
AST SERPL W P-5'-P-CCNC: 16 U/L (ref 15–37)
BILIRUB SERPL-MCNC: 0.3 MG/DL (ref ?–1.2)
BUN SERPL-MCNC: 14 MG/DL (ref 7–18)
BUN/CREAT SERPL: 15 (ref 6–20)
CALCIUM SERPL-MCNC: 9.5 MG/DL (ref 8.5–10.1)
CHLORIDE SERPL-SCNC: 105 MMOL/L (ref 98–107)
CHOLEST SERPL-MCNC: 153 MG/DL (ref 0–200)
CHOLEST/HDLC SERPL: 4.3 {RATIO}
CO2 SERPL-SCNC: 30 MMOL/L (ref 21–32)
CREAT SERPL-MCNC: 0.93 MG/DL (ref 0.7–1.3)
CTP QC/QA: YES
EGFR (NO RACE VARIABLE) (RUSH/TITUS): 96 ML/MIN/1.73M²
EST. AVERAGE GLUCOSE BLD GHB EST-MCNC: 107 MG/DL
FLUAV AG NPH QL: NEGATIVE
FLUBV AG NPH QL: NEGATIVE
GLOBULIN SER-MCNC: 3.6 G/DL (ref 2–4)
GLUCOSE SERPL-MCNC: 93 MG/DL (ref 74–106)
HBA1C MFR BLD HPLC: 5.8 % (ref 4.5–6.6)
HDLC SERPL-MCNC: 36 MG/DL (ref 40–60)
LDLC SERPL CALC-MCNC: 86 MG/DL
LDLC/HDLC SERPL: 2.4 {RATIO}
NONHDLC SERPL-MCNC: 117 MG/DL
POTASSIUM SERPL-SCNC: 4.4 MMOL/L (ref 3.5–5.1)
PROT SERPL-MCNC: 7.3 G/DL (ref 6.4–8.2)
SARS-COV-2 AG RESP QL IA.RAPID: NEGATIVE
SODIUM SERPL-SCNC: 139 MMOL/L (ref 136–145)
TRIGL SERPL-MCNC: 157 MG/DL (ref 35–150)
VLDLC SERPL-MCNC: 31 MG/DL

## 2022-12-28 PROCEDURE — 3044F PR MOST RECENT HEMOGLOBIN A1C LEVEL <7.0%: ICD-10-PCS | Mod: CPTII,,, | Performed by: NURSE PRACTITIONER

## 2022-12-28 PROCEDURE — 3061F PR NEG MICROALBUMINURIA RESULT DOCUMENTED/REVIEW: ICD-10-PCS | Mod: CPTII,,, | Performed by: NURSE PRACTITIONER

## 2022-12-28 PROCEDURE — 3066F NEPHROPATHY DOC TX: CPT | Mod: CPTII,,, | Performed by: NURSE PRACTITIONER

## 2022-12-28 PROCEDURE — 3074F PR MOST RECENT SYSTOLIC BLOOD PRESSURE < 130 MM HG: ICD-10-PCS | Mod: CPTII,,, | Performed by: NURSE PRACTITIONER

## 2022-12-28 PROCEDURE — 1159F PR MEDICATION LIST DOCUMENTED IN MEDICAL RECORD: ICD-10-PCS | Mod: CPTII,,, | Performed by: NURSE PRACTITIONER

## 2022-12-28 PROCEDURE — 4010F PR ACE/ARB THEARPY RXD/TAKEN: ICD-10-PCS | Mod: CPTII,,, | Performed by: NURSE PRACTITIONER

## 2022-12-28 PROCEDURE — 3061F NEG MICROALBUMINURIA REV: CPT | Mod: CPTII,,, | Performed by: NURSE PRACTITIONER

## 2022-12-28 PROCEDURE — 4010F ACE/ARB THERAPY RXD/TAKEN: CPT | Mod: CPTII,,, | Performed by: NURSE PRACTITIONER

## 2022-12-28 PROCEDURE — 80061 LIPID PANEL: ICD-10-PCS | Mod: ,,, | Performed by: CLINICAL MEDICAL LABORATORY

## 2022-12-28 PROCEDURE — 1160F PR REVIEW ALL MEDS BY PRESCRIBER/CLIN PHARMACIST DOCUMENTED: ICD-10-PCS | Mod: CPTII,,, | Performed by: NURSE PRACTITIONER

## 2022-12-28 PROCEDURE — 3079F DIAST BP 80-89 MM HG: CPT | Mod: CPTII,,, | Performed by: NURSE PRACTITIONER

## 2022-12-28 PROCEDURE — 87428 POCT SARS-COV2 (COVID) WITH FLU ANTIGEN: ICD-10-PCS | Mod: QW,,, | Performed by: NURSE PRACTITIONER

## 2022-12-28 PROCEDURE — 87428 SARSCOV & INF VIR A&B AG IA: CPT | Mod: QW,,, | Performed by: NURSE PRACTITIONER

## 2022-12-28 PROCEDURE — 80061 LIPID PANEL: CPT | Mod: ,,, | Performed by: CLINICAL MEDICAL LABORATORY

## 2022-12-28 PROCEDURE — 3008F PR BODY MASS INDEX (BMI) DOCUMENTED: ICD-10-PCS | Mod: CPTII,,, | Performed by: NURSE PRACTITIONER

## 2022-12-28 PROCEDURE — 1159F MED LIST DOCD IN RCRD: CPT | Mod: CPTII,,, | Performed by: NURSE PRACTITIONER

## 2022-12-28 PROCEDURE — 80053 COMPREHEN METABOLIC PANEL: CPT | Mod: ,,, | Performed by: CLINICAL MEDICAL LABORATORY

## 2022-12-28 PROCEDURE — 3066F PR DOCUMENTATION OF TREATMENT FOR NEPHROPATHY: ICD-10-PCS | Mod: CPTII,,, | Performed by: NURSE PRACTITIONER

## 2022-12-28 PROCEDURE — 3074F SYST BP LT 130 MM HG: CPT | Mod: CPTII,,, | Performed by: NURSE PRACTITIONER

## 2022-12-28 PROCEDURE — 3044F HG A1C LEVEL LT 7.0%: CPT | Mod: CPTII,,, | Performed by: NURSE PRACTITIONER

## 2022-12-28 PROCEDURE — 1160F RVW MEDS BY RX/DR IN RCRD: CPT | Mod: CPTII,,, | Performed by: NURSE PRACTITIONER

## 2022-12-28 PROCEDURE — 83036 HEMOGLOBIN GLYCOSYLATED A1C: CPT | Mod: ,,, | Performed by: CLINICAL MEDICAL LABORATORY

## 2022-12-28 PROCEDURE — 99214 OFFICE O/P EST MOD 30 MIN: CPT | Mod: ,,, | Performed by: NURSE PRACTITIONER

## 2022-12-28 PROCEDURE — 3008F BODY MASS INDEX DOCD: CPT | Mod: CPTII,,, | Performed by: NURSE PRACTITIONER

## 2022-12-28 PROCEDURE — 83036 HEMOGLOBIN A1C: ICD-10-PCS | Mod: ,,, | Performed by: CLINICAL MEDICAL LABORATORY

## 2022-12-28 PROCEDURE — 80053 COMPREHENSIVE METABOLIC PANEL: ICD-10-PCS | Mod: ,,, | Performed by: CLINICAL MEDICAL LABORATORY

## 2022-12-28 PROCEDURE — 3079F PR MOST RECENT DIASTOLIC BLOOD PRESSURE 80-89 MM HG: ICD-10-PCS | Mod: CPTII,,, | Performed by: NURSE PRACTITIONER

## 2022-12-28 PROCEDURE — 99214 PR OFFICE/OUTPT VISIT, EST, LEVL IV, 30-39 MIN: ICD-10-PCS | Mod: ,,, | Performed by: NURSE PRACTITIONER

## 2022-12-28 RX ORDER — HYDROCHLOROTHIAZIDE 12.5 MG/1
12.5 TABLET ORAL DAILY
Qty: 90 TABLET | Refills: 1 | Status: SHIPPED | OUTPATIENT
Start: 2022-12-28 | End: 2023-06-23 | Stop reason: SDUPTHER

## 2022-12-28 RX ORDER — AMLODIPINE BESYLATE 10 MG/1
TABLET ORAL
Qty: 90 TABLET | Refills: 1 | Status: SHIPPED | OUTPATIENT
Start: 2022-12-28 | End: 2023-06-23 | Stop reason: SDUPTHER

## 2022-12-28 RX ORDER — NABUMETONE 750 MG/1
TABLET, FILM COATED ORAL
Qty: 180 TABLET | Refills: 1 | Status: SHIPPED | OUTPATIENT
Start: 2022-12-28 | End: 2023-06-23 | Stop reason: SDUPTHER

## 2022-12-28 RX ORDER — CARVEDILOL 25 MG/1
TABLET ORAL
Qty: 180 TABLET | Refills: 1 | Status: SHIPPED | OUTPATIENT
Start: 2022-12-28 | End: 2023-06-23 | Stop reason: SDUPTHER

## 2022-12-28 RX ORDER — TIRZEPATIDE 5 MG/.5ML
5 INJECTION, SOLUTION SUBCUTANEOUS
Qty: 4 PEN | Refills: 5 | Status: SHIPPED | OUTPATIENT
Start: 2022-12-28 | End: 2023-06-23

## 2022-12-28 RX ORDER — LISINOPRIL 40 MG/1
40 TABLET ORAL DAILY
Qty: 90 TABLET | Refills: 1 | Status: SHIPPED | OUTPATIENT
Start: 2022-12-28 | End: 2023-06-23 | Stop reason: SDUPTHER

## 2022-12-28 RX ORDER — METFORMIN HYDROCHLORIDE 1000 MG/1
1000 TABLET ORAL 2 TIMES DAILY
Qty: 180 TABLET | Refills: 1 | Status: SHIPPED | OUTPATIENT
Start: 2022-12-28 | End: 2023-06-23 | Stop reason: SDUPTHER

## 2022-12-28 RX ORDER — ATORVASTATIN CALCIUM 40 MG/1
40 TABLET, FILM COATED ORAL NIGHTLY
Qty: 90 TABLET | Refills: 1 | Status: SHIPPED | OUTPATIENT
Start: 2022-12-28 | End: 2023-06-23 | Stop reason: SDUPTHER

## 2022-12-28 NOTE — PATIENT INSTRUCTIONS
Continue current medication regimen. Will call pt with lab results.Recommend exercise 30 minutes per day most days of the week. Follow up in 6 months for chronic medical problems.

## 2022-12-28 NOTE — PROGRESS NOTES
Cassidy Garcia DNP, CHELO    [address] [place] [address]     PATIENT NAME: Kalpesh Saleem  : 1965  DATE: 22  MRN: [id number]      Billing Provider: Cassidy Garcia DNP, FNP  Level of Service:   Patient PCP Information       Provider PCP Type    Amol Gonzalez MD General            Reason for Visit / Chief Complaint: Medication Refill, Nasal Congestion (Says he started with cold symptoms when the weather got cold last week.  Denies fever.  Says his son had strep last week.), Cough (Unproductive cough), and Sore Throat (Described as itchy more than sore)       Update PCP  Update Chief Complaint         History of Present Illness / Problem Focused Workflow     Kalpesh Saleem presents to the clinic with Medication Refill, Nasal Congestion (Says he started with cold symptoms when the weather got cold last week.  Denies fever.  Says his son had strep last week.), Cough (Unproductive cough), and Sore Throat (Described as itchy more than sore)   Pt states otc meds are helping symptoms. 25 lb weight loss in 6 months after starting mounjaro with good BS  control.   Medication Refill  Associated symptoms include coughing and a sore throat. Pertinent negatives include no abdominal pain, arthralgias, change in bowel habit, chest pain, chills, congestion, fatigue, fever, headaches, joint swelling, myalgias, nausea, neck pain, rash, vomiting or weakness.   Cough  Associated symptoms include a sore throat. Pertinent negatives include no chest pain, chills, ear pain, fever, headaches, myalgias, postnasal drip, rash, rhinorrhea, shortness of breath or wheezing.   Sore Throat   Associated symptoms include coughing. Pertinent negatives include no abdominal pain, congestion, diarrhea, ear pain, headaches, neck pain, shortness of breath, trouble swallowing or vomiting.     Review of Systems     Review of Systems   Constitutional:  Negative for activity change, appetite change,  chills, fatigue, fever and unexpected weight change.   HENT:  Positive for sore throat. Negative for nasal congestion, ear pain, hearing loss, postnasal drip, rhinorrhea and trouble swallowing.    Eyes:  Negative for discharge and visual disturbance.   Respiratory:  Positive for cough. Negative for chest tightness, shortness of breath and wheezing.    Cardiovascular:  Negative for chest pain, palpitations, leg swelling and claudication.   Gastrointestinal:  Negative for abdominal pain, blood in stool, change in bowel habit, constipation, diarrhea, nausea, vomiting and change in bowel habit.   Endocrine: Negative for polydipsia and polyuria.   Genitourinary:  Negative for difficulty urinating, dysuria, hematuria and urgency.   Musculoskeletal:  Negative for arthralgias, back pain, gait problem, joint swelling, myalgias and neck pain.   Integumentary:  Negative for rash.   Neurological:  Negative for weakness and headaches.   Psychiatric/Behavioral:  Negative for confusion, dysphoric mood and suicidal ideas. The patient is not nervous/anxious.       Medical / Social / Family History     Past Medical History:   Diagnosis Date    Diabetes mellitus, type 2     Hyperlipidemia     Sleep apnea        History reviewed. No pertinent surgical history.    Social History  Mr. Kalpesh Saleem  reports that he has quit smoking. He has never used smokeless tobacco. He reports current alcohol use of about 8.0 standard drinks per week. He reports that he does not use drugs.    Family History  Mr. Kalpesh Saleem's family history includes Diabetes in his mother.    Medications and Allergies     Medications  Outpatient Medications Marked as Taking for the 12/28/22 encounter (Office Visit) with Cassidy Garcia, KB, FNP   Medication Sig Dispense Refill    aspirin 81 MG Chew Take 81 mg by mouth once daily.      [DISCONTINUED] amLODIPine (NORVASC) 10 MG tablet TAKE 1 TABLET BY MOUTH EVERY DAY FOR BLOOD PRESSURE 90 tablet 1    [DISCONTINUED]  "atorvastatin (LIPITOR) 40 MG tablet Take 1 tablet (40 mg total) by mouth nightly. For CHOLESTEROL 90 tablet 1    [DISCONTINUED] carvediloL (COREG) 25 MG tablet TAKE 1 TABLET BY MOUTH 2 TIMES DAILY. FOR BLOOD PRESSURE 180 tablet 1    [DISCONTINUED] hydroCHLOROthiazide (HYDRODIURIL) 12.5 MG Tab Take 1 tablet (12.5 mg total) by mouth once daily. FOR BLOOD PRESSURE 90 tablet 1    [DISCONTINUED] lisinopriL (PRINIVIL,ZESTRIL) 40 MG tablet Take 1 tablet (40 mg total) by mouth once daily. FOR BLOOD PRESSURE 90 tablet 1    [DISCONTINUED] metFORMIN (GLUCOPHAGE) 1000 MG tablet Take 1 tablet (1,000 mg total) by mouth 2 (two) times daily. FOR DIABETES 180 tablet 1    [DISCONTINUED] MOUNJARO 5 mg/0.5 mL PnIj INJECT 5 MG INTO THE SKIN EVERY 7 DAYS. FOR DIABETES 2 pen 2    [DISCONTINUED] nabumetone (RELAFEN) 750 MG tablet TAKE 2 TABLETS BY MOUTH EVERY DAY AFTER SUPPER FOR PAIN AND INFLAMMATION 180 tablet 1       Allergies  Review of patient's allergies indicates:  No Known Allergies    Physical Examination     Vitals:    12/28/22 0816   BP: 112/80   BP Location: Left arm   Patient Position: Sitting   BP Method: Large (Automatic)   Pulse: 66   Resp: 18   Temp: 99.3 °F (37.4 °C)   TempSrc: Oral   SpO2: 96%   Weight: (!) 142.9 kg (315 lb)   Height: 5' 8" (1.727 m)     Physical Exam  Vitals and nursing note reviewed.   Constitutional:       General: He is not in acute distress.     Appearance: He is obese.   HENT:      Nose: Rhinorrhea present.      Mouth/Throat:      Mouth: Mucous membranes are moist.   Eyes:      Pupils: Pupils are equal, round, and reactive to light.   Cardiovascular:      Rate and Rhythm: Normal rate and regular rhythm.      Pulses: Normal pulses.      Heart sounds: No murmur heard.  Pulmonary:      Effort: Pulmonary effort is normal. No respiratory distress.      Breath sounds: Normal breath sounds. No wheezing, rhonchi or rales.   Chest:      Chest wall: No tenderness.   Abdominal:      General: Bowel sounds are " normal. There is no distension.      Palpations: Abdomen is soft.      Tenderness: There is no abdominal tenderness. There is no right CVA tenderness, left CVA tenderness, guarding or rebound.   Musculoskeletal:         General: No swelling or tenderness. Normal range of motion.      Cervical back: Normal range of motion and neck supple.      Right lower leg: No edema.      Left lower leg: No edema.   Skin:     General: Skin is warm and dry.   Neurological:      General: No focal deficit present.      Mental Status: He is alert and oriented to person, place, and time.   Psychiatric:         Mood and Affect: Mood normal.         Behavior: Behavior normal.         Thought Content: Thought content normal.         Judgment: Judgment normal.        Assessment and Plan (including Health Maintenance)      Problem List  Smart Sets  Document Outside HM   :    Plan:         Health Maintenance Due   Topic Date Due    Foot Exam  Never done    Eye Exam  Never done    TETANUS VACCINE  Never done    Shingles Vaccine (1 of 2) Never done    COVID-19 Vaccine (3 - Booster for Moderna series) 10/28/2021       Problem List Items Addressed This Visit          Cardiac/Vascular    Essential hypertension, benign (Chronic)    Relevant Medications    amLODIPine (NORVASC) 10 MG tablet    carvediloL (COREG) 25 MG tablet    hydroCHLOROthiazide (HYDRODIURIL) 12.5 MG Tab    lisinopriL (PRINIVIL,ZESTRIL) 40 MG tablet    Other Relevant Orders    Comprehensive Metabolic Panel    Lipid Panel    Hyperlipidemia (Chronic)    Relevant Medications    atorvastatin (LIPITOR) 40 MG tablet    Other Relevant Orders    Comprehensive Metabolic Panel    Lipid Panel       Endocrine    Type 2 diabetes mellitus without complication, without long-term current use of insulin - Primary (Chronic)    Relevant Medications    metFORMIN (GLUCOPHAGE) 1000 MG tablet    tirzepatide (MOUNJARO) 5 mg/0.5 mL PnIj    Other Relevant Orders    Comprehensive Metabolic Panel    Lipid  Panel    Hemoglobin A1C       Orthopedic    Inflammation of joint    Relevant Medications    nabumetone (RELAFEN) 750 MG tablet     Other Visit Diagnoses       Encounter for laboratory testing for COVID-19 virus        Relevant Orders    POCT SARS-COV2 (COVID) with Flu Antigen (Completed)          Type 2 diabetes mellitus without complication, without long-term current use of insulin  -     metFORMIN (GLUCOPHAGE) 1000 MG tablet; Take 1 tablet (1,000 mg total) by mouth 2 (two) times daily. FOR DIABETES  Dispense: 180 tablet; Refill: 1  -     tirzepatide (MOUNJARO) 5 mg/0.5 mL PnIj; Inject 5 mg into the skin every 7 days. For DIABETES  Dispense: 4 pen; Refill: 5  -     Comprehensive Metabolic Panel; Future; Expected date: 12/28/2022  -     Lipid Panel; Future; Expected date: 12/28/2022  -     Hemoglobin A1C; Future; Expected date: 12/28/2022    Essential hypertension, benign  -     amLODIPine (NORVASC) 10 MG tablet; TAKE 1 TABLET BY MOUTH EVERY DAY FOR BLOOD PRESSURE  Dispense: 90 tablet; Refill: 1  -     carvediloL (COREG) 25 MG tablet; TAKE 1 TABLET BY MOUTH 2 TIMES DAILY. FOR BLOOD PRESSURE  Dispense: 180 tablet; Refill: 1  -     hydroCHLOROthiazide (HYDRODIURIL) 12.5 MG Tab; Take 1 tablet (12.5 mg total) by mouth once daily. FOR BLOOD PRESSURE  Dispense: 90 tablet; Refill: 1  -     lisinopriL (PRINIVIL,ZESTRIL) 40 MG tablet; Take 1 tablet (40 mg total) by mouth once daily. FOR BLOOD PRESSURE  Dispense: 90 tablet; Refill: 1  -     Comprehensive Metabolic Panel; Future; Expected date: 12/28/2022  -     Lipid Panel; Future; Expected date: 12/28/2022    Hyperlipidemia, unspecified hyperlipidemia type  -     atorvastatin (LIPITOR) 40 MG tablet; Take 1 tablet (40 mg total) by mouth nightly. For CHOLESTEROL  Dispense: 90 tablet; Refill: 1  -     Comprehensive Metabolic Panel; Future; Expected date: 12/28/2022  -     Lipid Panel; Future; Expected date: 12/28/2022    Inflammation of joint  -     nabumetone (RELAFEN) 750 MG  tablet; TAKE 2 TABLETS BY MOUTH EVERY DAY AFTER SUPPER FOR PAIN AND INFLAMMATION  Dispense: 180 tablet; Refill: 1    Encounter for laboratory testing for COVID-19 virus  -     POCT SARS-COV2 (COVID) with Flu Antigen       Health Maintenance Topics with due status: Not Due       Topic Last Completion Date    Colorectal Cancer Screening 02/16/2022    Diabetes Urine Screening 07/08/2022    Lipid Panel 07/08/2022    Hemoglobin A1c 07/08/2022    Low Dose Statin 12/28/2022       No future appointments.       Follow up in about 6 months (around 6/28/2023).     Signature:  Cassidy Garcia DNP, FNP  [address] [place] [address]  Phone #: [phone]  Fax #: [phone]    Date of encounter: 12/28/22    Patient Instructions   Continue current medication regimen. Will call pt with lab results.Recommend exercise 30 minutes per day most days of the week. Follow up in 6 months for chronic medical problems.

## 2023-01-27 LAB
LEFT EYE DM RETINOPATHY: NORMAL
RIGHT EYE DM RETINOPATHY: NORMAL

## 2023-06-23 ENCOUNTER — OFFICE VISIT (OUTPATIENT)
Dept: FAMILY MEDICINE | Facility: CLINIC | Age: 58
End: 2023-06-23
Payer: COMMERCIAL

## 2023-06-23 VITALS
RESPIRATION RATE: 20 BRPM | HEART RATE: 75 BPM | WEIGHT: 313 LBS | BODY MASS INDEX: 47.44 KG/M2 | OXYGEN SATURATION: 95 % | HEIGHT: 68 IN | SYSTOLIC BLOOD PRESSURE: 106 MMHG | DIASTOLIC BLOOD PRESSURE: 73 MMHG

## 2023-06-23 DIAGNOSIS — I10 ESSENTIAL HYPERTENSION, BENIGN: Chronic | ICD-10-CM

## 2023-06-23 DIAGNOSIS — M19.90 INFLAMMATION OF JOINT: Chronic | ICD-10-CM

## 2023-06-23 DIAGNOSIS — E78.2 MIXED HYPERLIPIDEMIA: Chronic | ICD-10-CM

## 2023-06-23 DIAGNOSIS — E78.5 HYPERLIPIDEMIA, UNSPECIFIED HYPERLIPIDEMIA TYPE: Chronic | ICD-10-CM

## 2023-06-23 DIAGNOSIS — E11.9 TYPE 2 DIABETES MELLITUS WITHOUT COMPLICATION, WITHOUT LONG-TERM CURRENT USE OF INSULIN: Primary | Chronic | ICD-10-CM

## 2023-06-23 LAB
ALBUMIN SERPL BCP-MCNC: 3.6 G/DL (ref 3.5–5)
ALBUMIN/GLOB SERPL: 1.1 {RATIO}
ALP SERPL-CCNC: 83 U/L (ref 45–115)
ALT SERPL W P-5'-P-CCNC: 29 U/L (ref 16–61)
ANION GAP SERPL CALCULATED.3IONS-SCNC: 9 MMOL/L (ref 7–16)
AST SERPL W P-5'-P-CCNC: 13 U/L (ref 15–37)
BILIRUB SERPL-MCNC: 0.4 MG/DL (ref ?–1.2)
BILIRUB UR QL STRIP: NEGATIVE
BUN SERPL-MCNC: 12 MG/DL (ref 7–18)
BUN/CREAT SERPL: 13 (ref 6–20)
CALCIUM SERPL-MCNC: 9.6 MG/DL (ref 8.5–10.1)
CHLORIDE SERPL-SCNC: 106 MMOL/L (ref 98–107)
CHOLEST SERPL-MCNC: 124 MG/DL (ref 0–200)
CHOLEST/HDLC SERPL: 3.6 {RATIO}
CLARITY UR: CLEAR
CO2 SERPL-SCNC: 28 MMOL/L (ref 21–32)
COLOR UR: COLORLESS
CREAT SERPL-MCNC: 0.96 MG/DL (ref 0.7–1.3)
CREAT UR-MCNC: 70 MG/DL (ref 39–259)
EGFR (NO RACE VARIABLE) (RUSH/TITUS): 92 ML/MIN/1.73M2
EST. AVERAGE GLUCOSE BLD GHB EST-MCNC: 110 MG/DL
GLOBULIN SER-MCNC: 3.4 G/DL (ref 2–4)
GLUCOSE SERPL-MCNC: 107 MG/DL (ref 74–106)
GLUCOSE UR STRIP-MCNC: NORMAL MG/DL
HBA1C MFR BLD HPLC: 5.9 % (ref 4.5–6.6)
HDLC SERPL-MCNC: 34 MG/DL (ref 40–60)
KETONES UR STRIP-SCNC: NEGATIVE MG/DL
LDLC SERPL CALC-MCNC: 66 MG/DL
LDLC/HDLC SERPL: 1.9 {RATIO}
LEUKOCYTE ESTERASE UR QL STRIP: NEGATIVE
MICROALBUMIN UR-MCNC: 0.7 MG/DL (ref 0–2.8)
MICROALBUMIN/CREAT RATIO PNL UR: 10 MG/G (ref 0–30)
NITRITE UR QL STRIP: NEGATIVE
NONHDLC SERPL-MCNC: 90 MG/DL
PH UR STRIP: 5.5 PH UNITS
POTASSIUM SERPL-SCNC: 4.1 MMOL/L (ref 3.5–5.1)
PROT SERPL-MCNC: 7 G/DL (ref 6.4–8.2)
PROT UR QL STRIP: NEGATIVE
RBC # UR STRIP: NEGATIVE /UL
SODIUM SERPL-SCNC: 139 MMOL/L (ref 136–145)
SP GR UR STRIP: 1.01
TRIGL SERPL-MCNC: 118 MG/DL (ref 35–150)
UROBILINOGEN UR STRIP-ACNC: NORMAL MG/DL
VLDLC SERPL-MCNC: 24 MG/DL

## 2023-06-23 PROCEDURE — 99214 OFFICE O/P EST MOD 30 MIN: CPT | Mod: ,,, | Performed by: FAMILY MEDICINE

## 2023-06-23 PROCEDURE — 1159F PR MEDICATION LIST DOCUMENTED IN MEDICAL RECORD: ICD-10-PCS | Mod: CPTII,,, | Performed by: FAMILY MEDICINE

## 2023-06-23 PROCEDURE — 80061 LIPID PANEL: ICD-10-PCS | Mod: ,,, | Performed by: CLINICAL MEDICAL LABORATORY

## 2023-06-23 PROCEDURE — 3078F DIAST BP <80 MM HG: CPT | Mod: CPTII,,, | Performed by: FAMILY MEDICINE

## 2023-06-23 PROCEDURE — 82570 MICROALBUMIN / CREATININE RATIO URINE: ICD-10-PCS | Mod: ,,, | Performed by: CLINICAL MEDICAL LABORATORY

## 2023-06-23 PROCEDURE — 1160F RVW MEDS BY RX/DR IN RCRD: CPT | Mod: CPTII,,, | Performed by: FAMILY MEDICINE

## 2023-06-23 PROCEDURE — 80053 COMPREHENSIVE METABOLIC PANEL: ICD-10-PCS | Mod: ,,, | Performed by: CLINICAL MEDICAL LABORATORY

## 2023-06-23 PROCEDURE — 82043 MICROALBUMIN / CREATININE RATIO URINE: ICD-10-PCS | Mod: ,,, | Performed by: CLINICAL MEDICAL LABORATORY

## 2023-06-23 PROCEDURE — 1159F MED LIST DOCD IN RCRD: CPT | Mod: CPTII,,, | Performed by: FAMILY MEDICINE

## 2023-06-23 PROCEDURE — 3008F BODY MASS INDEX DOCD: CPT | Mod: CPTII,,, | Performed by: FAMILY MEDICINE

## 2023-06-23 PROCEDURE — 3074F SYST BP LT 130 MM HG: CPT | Mod: CPTII,,, | Performed by: FAMILY MEDICINE

## 2023-06-23 PROCEDURE — 81003 URINALYSIS, REFLEX TO URINE CULTURE: ICD-10-PCS | Mod: QW,,, | Performed by: CLINICAL MEDICAL LABORATORY

## 2023-06-23 PROCEDURE — 99214 PR OFFICE/OUTPT VISIT, EST, LEVL IV, 30-39 MIN: ICD-10-PCS | Mod: ,,, | Performed by: FAMILY MEDICINE

## 2023-06-23 PROCEDURE — 83036 HEMOGLOBIN GLYCOSYLATED A1C: CPT | Mod: ,,, | Performed by: CLINICAL MEDICAL LABORATORY

## 2023-06-23 PROCEDURE — 83036 HEMOGLOBIN A1C: ICD-10-PCS | Mod: ,,, | Performed by: CLINICAL MEDICAL LABORATORY

## 2023-06-23 PROCEDURE — 81003 URINALYSIS AUTO W/O SCOPE: CPT | Mod: QW,,, | Performed by: CLINICAL MEDICAL LABORATORY

## 2023-06-23 PROCEDURE — 80053 COMPREHEN METABOLIC PANEL: CPT | Mod: ,,, | Performed by: CLINICAL MEDICAL LABORATORY

## 2023-06-23 PROCEDURE — 82570 ASSAY OF URINE CREATININE: CPT | Mod: ,,, | Performed by: CLINICAL MEDICAL LABORATORY

## 2023-06-23 PROCEDURE — 3078F PR MOST RECENT DIASTOLIC BLOOD PRESSURE < 80 MM HG: ICD-10-PCS | Mod: CPTII,,, | Performed by: FAMILY MEDICINE

## 2023-06-23 PROCEDURE — 3074F PR MOST RECENT SYSTOLIC BLOOD PRESSURE < 130 MM HG: ICD-10-PCS | Mod: CPTII,,, | Performed by: FAMILY MEDICINE

## 2023-06-23 PROCEDURE — 4010F ACE/ARB THERAPY RXD/TAKEN: CPT | Mod: CPTII,,, | Performed by: FAMILY MEDICINE

## 2023-06-23 PROCEDURE — 80061 LIPID PANEL: CPT | Mod: ,,, | Performed by: CLINICAL MEDICAL LABORATORY

## 2023-06-23 PROCEDURE — 3008F PR BODY MASS INDEX (BMI) DOCUMENTED: ICD-10-PCS | Mod: CPTII,,, | Performed by: FAMILY MEDICINE

## 2023-06-23 PROCEDURE — 1160F PR REVIEW ALL MEDS BY PRESCRIBER/CLIN PHARMACIST DOCUMENTED: ICD-10-PCS | Mod: CPTII,,, | Performed by: FAMILY MEDICINE

## 2023-06-23 PROCEDURE — 4010F PR ACE/ARB THEARPY RXD/TAKEN: ICD-10-PCS | Mod: CPTII,,, | Performed by: FAMILY MEDICINE

## 2023-06-23 PROCEDURE — 82043 UR ALBUMIN QUANTITATIVE: CPT | Mod: ,,, | Performed by: CLINICAL MEDICAL LABORATORY

## 2023-06-23 RX ORDER — HYDROCHLOROTHIAZIDE 12.5 MG/1
12.5 TABLET ORAL DAILY
Qty: 90 TABLET | Refills: 1 | Status: SHIPPED | OUTPATIENT
Start: 2023-06-23 | End: 2023-12-15 | Stop reason: SDUPTHER

## 2023-06-23 RX ORDER — CARVEDILOL 25 MG/1
TABLET ORAL
Qty: 180 TABLET | Refills: 1 | Status: SHIPPED | OUTPATIENT
Start: 2023-06-23 | End: 2023-12-15 | Stop reason: SDUPTHER

## 2023-06-23 RX ORDER — METFORMIN HYDROCHLORIDE 1000 MG/1
1000 TABLET ORAL 2 TIMES DAILY
Qty: 180 TABLET | Refills: 1 | Status: SHIPPED | OUTPATIENT
Start: 2023-06-23 | End: 2023-12-15 | Stop reason: SDUPTHER

## 2023-06-23 RX ORDER — NABUMETONE 750 MG/1
TABLET, FILM COATED ORAL
Qty: 180 TABLET | Refills: 1 | Status: SHIPPED | OUTPATIENT
Start: 2023-06-23 | End: 2023-12-15 | Stop reason: SDUPTHER

## 2023-06-23 RX ORDER — TIRZEPATIDE 5 MG/.5ML
5 INJECTION, SOLUTION SUBCUTANEOUS
Qty: 4 PEN | Refills: 5 | Status: CANCELLED | OUTPATIENT
Start: 2023-06-23

## 2023-06-23 RX ORDER — AMLODIPINE BESYLATE 10 MG/1
TABLET ORAL
Qty: 90 TABLET | Refills: 1 | Status: SHIPPED | OUTPATIENT
Start: 2023-06-23 | End: 2023-12-15 | Stop reason: SDUPTHER

## 2023-06-23 RX ORDER — LISINOPRIL 40 MG/1
40 TABLET ORAL DAILY
Qty: 90 TABLET | Refills: 1 | Status: SHIPPED | OUTPATIENT
Start: 2023-06-23 | End: 2023-12-15 | Stop reason: SDUPTHER

## 2023-06-23 RX ORDER — ATORVASTATIN CALCIUM 40 MG/1
40 TABLET, FILM COATED ORAL NIGHTLY
Qty: 90 TABLET | Refills: 1 | Status: SHIPPED | OUTPATIENT
Start: 2023-06-23 | End: 2023-12-15 | Stop reason: SDUPTHER

## 2023-06-23 NOTE — PROGRESS NOTES
Amol Gonzalez MD    [address] [place] [address]     PATIENT NAME: Kalpesh Saleem  : 1965  DATE: 23  MRN: [id number]      Billing Provider: Amol Gonzalez MD  Level of Service: TX OFFICE/OUTPT VISIT, EST, LEVL IV, 30-39 MIN  Patient PCP Information       Provider PCP Type    Amol Gonzalez MD General            Reason for Visit / Chief Complaint: Follow-up (Here today for 6 month check up and lab. ) and Medication Refill (Needs refill on medication)       Update PCP  Update Chief Complaint         History of Present Illness / Problem Focused Workflow     Kalpesh Saleem presents to the clinic with Follow-up (Here today for 6 month check up and lab. ) and Medication Refill (Needs refill on medication)     HPI    Review of Systems     Review of Systems   Constitutional:  Negative for activity change, appetite change, chills, fatigue, fever and unexpected weight change.   HENT:  Negative for nasal congestion, ear pain, hearing loss, postnasal drip, rhinorrhea, sore throat and trouble swallowing.    Eyes:  Negative for discharge and visual disturbance.   Respiratory:  Negative for cough, chest tightness, shortness of breath and wheezing.    Cardiovascular:  Negative for chest pain, palpitations, leg swelling and claudication.   Gastrointestinal:  Negative for abdominal pain, blood in stool, change in bowel habit, constipation, diarrhea, nausea, vomiting and change in bowel habit.   Endocrine: Negative for polydipsia and polyuria.   Genitourinary:  Negative for difficulty urinating, dysuria, hematuria and urgency.   Musculoskeletal:  Negative for arthralgias, back pain, gait problem, joint swelling, myalgias and neck pain.   Integumentary:  Negative for rash.   Neurological:  Negative for weakness and headaches.   Psychiatric/Behavioral:  Negative for confusion, dysphoric mood and suicidal ideas. The patient is not nervous/anxious.       Medical /  Social / Family History     Past Medical History:   Diagnosis Date    Diabetes mellitus, type 2     Hyperlipidemia     Hypertension     Sleep apnea        History reviewed. No pertinent surgical history.    Social History    reports that he has quit smoking. He has never been exposed to tobacco smoke. He has never used smokeless tobacco. He reports current alcohol use of about 8.0 standard drinks per week. He reports that he does not use drugs.    Family History  's family history includes Diabetes in his mother.    Medications and Allergies     Medications  Outpatient Medications Marked as Taking for the 6/23/23 encounter (Office Visit) with Amol Gonzalez MD   Medication Sig Dispense Refill    aspirin 81 MG Chew Take 81 mg by mouth once daily.      [DISCONTINUED] amLODIPine (NORVASC) 10 MG tablet TAKE 1 TABLET BY MOUTH EVERY DAY FOR BLOOD PRESSURE 90 tablet 1    [DISCONTINUED] atorvastatin (LIPITOR) 40 MG tablet Take 1 tablet (40 mg total) by mouth nightly. For CHOLESTEROL 90 tablet 1    [DISCONTINUED] carvediloL (COREG) 25 MG tablet TAKE 1 TABLET BY MOUTH 2 TIMES DAILY. FOR BLOOD PRESSURE 180 tablet 1    [DISCONTINUED] hydroCHLOROthiazide (HYDRODIURIL) 12.5 MG Tab Take 1 tablet (12.5 mg total) by mouth once daily. FOR BLOOD PRESSURE 90 tablet 1    [DISCONTINUED] lisinopriL (PRINIVIL,ZESTRIL) 40 MG tablet Take 1 tablet (40 mg total) by mouth once daily. FOR BLOOD PRESSURE 90 tablet 1    [DISCONTINUED] metFORMIN (GLUCOPHAGE) 1000 MG tablet Take 1 tablet (1,000 mg total) by mouth 2 (two) times daily. FOR DIABETES 180 tablet 1    [DISCONTINUED] nabumetone (RELAFEN) 750 MG tablet TAKE 2 TABLETS BY MOUTH EVERY DAY AFTER SUPPER FOR PAIN AND INFLAMMATION 180 tablet 1    [DISCONTINUED] tirzepatide (MOUNJARO) 5 mg/0.5 mL PnIj Inject 5 mg into the skin every 7 days. For DIABETES 4 pen 5       Allergies  Review of patient's allergies indicates:  No Known Allergies    Physical Examination     Vitals:    06/23/23  0801   BP: 106/73   Pulse: 75   Resp: 20     Physical Exam  Vitals and nursing note reviewed.   Constitutional:       General: He is not in acute distress.     Appearance: Normal appearance. He is not ill-appearing.   Eyes:      Extraocular Movements: Extraocular movements intact.      Pupils: Pupils are equal, round, and reactive to light.   Cardiovascular:      Rate and Rhythm: Normal rate and regular rhythm.      Pulses:           Dorsalis pedis pulses are 2+ on the right side and 2+ on the left side.        Posterior tibial pulses are 2+ on the right side and 2+ on the left side.      Heart sounds: Normal heart sounds.   Pulmonary:      Effort: Pulmonary effort is normal.      Breath sounds: Normal breath sounds.   Abdominal:      General: Bowel sounds are normal.      Palpations: Abdomen is soft.   Musculoskeletal:         General: Normal range of motion.      Right foot: Normal range of motion. No deformity, bunion, Charcot foot, foot drop or prominent metatarsal heads.      Left foot: Normal range of motion. No deformity, bunion, Charcot foot, foot drop or prominent metatarsal heads.   Feet:      Right foot:      Protective Sensation: 10 sites tested.  10 sites sensed.      Skin integrity: Skin integrity normal.      Toenail Condition: Right toenails are normal.      Left foot:      Protective Sensation: 10 sites tested.  10 sites sensed.      Skin integrity: Skin integrity normal. No dry skin.      Toenail Condition: Left toenails are normal.   Skin:     Findings: No rash.   Neurological:      General: No focal deficit present.      Mental Status: He is alert and oriented to person, place, and time. Mental status is at baseline.   Psychiatric:         Mood and Affect: Mood normal.         Behavior: Behavior normal.          Assessment and Plan (including Health Maintenance)      Problem List  Smart Sets  Document Outside HM   :    Plan:         Health Maintenance Due   Topic Date Due    TETANUS VACCINE   Never done    Shingles Vaccine (1 of 2) Never done    COVID-19 Vaccine (3 - Moderna series) 10/28/2021    Hemoglobin A1c  06/28/2023    Diabetes Urine Screening  07/08/2023       Problem List Items Addressed This Visit          Cardiac/Vascular    Essential hypertension, benign (Chronic)    Relevant Medications    lisinopriL (PRINIVIL,ZESTRIL) 40 MG tablet    hydroCHLOROthiazide (HYDRODIURIL) 12.5 MG Tab    amLODIPine (NORVASC) 10 MG tablet    carvediloL (COREG) 25 MG tablet    Other Relevant Orders    Comprehensive Metabolic Panel    Microalbumin/Creatinine Ratio, Urine    Urinalysis, Reflex to Urine Culture    Hyperlipidemia (Chronic)    Relevant Medications    atorvastatin (LIPITOR) 40 MG tablet    Other Relevant Orders    Lipid Panel       Endocrine    Type 2 diabetes mellitus without complication, without long-term current use of insulin - Primary (Chronic)    Current Assessment & Plan     Increased mounjaro to 10mg      - Check glucose outside of clinic, record numbers, and bring log to follow up visit.    - Take medications as directed, and bring all medications to every clinic appointment.    - Eat a diabetic diet, if there are concerns about what that entails, we have a diabetic educator in our clinic.    - Cardiovascular exercise at least 3 times per week, for at least 15 minutes.    - Patient should be on a Statin medication, unless patient cannot tolerate a Statin.    - Aspirin should be taken everyday,  81mg, unless a higher dose is indicated for other medicaiton conditions. Also do not recommend Aspirin for a patient with known adverse effects from Aspirin.    - Recommend yearly, dilated eye exams for all Diabetic Patients.    - Monitor feet for calluses, abrasion, or other abnormalities. Report any concerns at every clinic visit.    -   Hemoglobin A1C   Date Value Ref Range Status   12/28/2022 5.8 4.5 - 6.6 % Final     Comment:       Normal:               <5.7%  Pre-Diabetic:       5.7% to  6.4%  Diabetic:             >6.4%  Diabetic Goal:     <7%   07/08/2022 6.3 4.5 - 6.6 % Final     Comment:       Normal:               <5.7%  Pre-Diabetic:       5.7% to 6.4%  Diabetic:             >6.4%  Diabetic Goal:     <7%   01/14/2022 6.1 4.5 - 6.6 % Final     Comment:       Normal:               <5.7%  Pre-Diabetic:       5.7% to 6.4%  Diabetic:             >6.4%  Diabetic Goal:     <7%               Relevant Medications    metFORMIN (GLUCOPHAGE) 1000 MG tablet    Other Relevant Orders    Hemoglobin A1C       Orthopedic    Inflammation of joint    Relevant Medications    nabumetone (RELAFEN) 750 MG tablet       Health Maintenance Topics with due status: Not Due       Topic Last Completion Date    Colorectal Cancer Screening 02/16/2022    Lipid Panel 12/28/2022    Eye Exam 01/27/2023    Low Dose Statin 06/23/2023    Foot Exam 06/23/2023       Procedures     No future appointments.     Follow up in about 6 months (around 12/23/2023) for chronic health problems, diabetes, hypertension.     Signature:  Amol Gonzalez MD  [address] [place] [address]  Phone #: [phone]  Fax #: [phone]    Date of encounter: 6/23/23    There are no Patient Instructions on file for this visit.

## 2023-06-23 NOTE — ASSESSMENT & PLAN NOTE
Increased mounjaro to 10mg      - Check glucose outside of clinic, record numbers, and bring log to follow up visit.    - Take medications as directed, and bring all medications to every clinic appointment.    - Eat a diabetic diet, if there are concerns about what that entails, we have a diabetic educator in our clinic.    - Cardiovascular exercise at least 3 times per week, for at least 15 minutes.    - Patient should be on a Statin medication, unless patient cannot tolerate a Statin.    - Aspirin should be taken everyday,  81mg, unless a higher dose is indicated for other medicaiton conditions. Also do not recommend Aspirin for a patient with known adverse effects from Aspirin.    - Recommend yearly, dilated eye exams for all Diabetic Patients.    - Monitor feet for calluses, abrasion, or other abnormalities. Report any concerns at every clinic visit.    -   Hemoglobin A1C   Date Value Ref Range Status   12/28/2022 5.8 4.5 - 6.6 % Final     Comment:       Normal:               <5.7%  Pre-Diabetic:       5.7% to 6.4%  Diabetic:             >6.4%  Diabetic Goal:     <7%   07/08/2022 6.3 4.5 - 6.6 % Final     Comment:       Normal:               <5.7%  Pre-Diabetic:       5.7% to 6.4%  Diabetic:             >6.4%  Diabetic Goal:     <7%   01/14/2022 6.1 4.5 - 6.6 % Final     Comment:       Normal:               <5.7%  Pre-Diabetic:       5.7% to 6.4%  Diabetic:             >6.4%  Diabetic Goal:     <7%

## 2023-10-09 ENCOUNTER — TELEPHONE (OUTPATIENT)
Dept: FAMILY MEDICINE | Facility: CLINIC | Age: 58
End: 2023-10-09
Payer: COMMERCIAL

## 2023-10-09 NOTE — TELEPHONE ENCOUNTER
----- Message from Sakshi Mahmood sent at 10/7/2023 11:48 AM CDT -----  Regarding: paper drop off/fill out  Pt stated that he dropped off a biometric paper to be filled out and he was wanting to come by Wedn and pick it up if Dr Gonzalez could fill it out please. Thank you

## 2023-12-15 ENCOUNTER — OFFICE VISIT (OUTPATIENT)
Dept: FAMILY MEDICINE | Facility: CLINIC | Age: 58
End: 2023-12-15
Payer: COMMERCIAL

## 2023-12-15 VITALS
BODY MASS INDEX: 45.16 KG/M2 | SYSTOLIC BLOOD PRESSURE: 113 MMHG | HEIGHT: 68 IN | RESPIRATION RATE: 20 BRPM | TEMPERATURE: 98 F | OXYGEN SATURATION: 97 % | HEART RATE: 82 BPM | WEIGHT: 298 LBS | DIASTOLIC BLOOD PRESSURE: 74 MMHG

## 2023-12-15 DIAGNOSIS — I10 ESSENTIAL HYPERTENSION, BENIGN: Chronic | ICD-10-CM

## 2023-12-15 DIAGNOSIS — Z23 ENCOUNTER FOR IMMUNIZATION: ICD-10-CM

## 2023-12-15 DIAGNOSIS — M19.90 INFLAMMATION OF JOINT: Chronic | ICD-10-CM

## 2023-12-15 DIAGNOSIS — E66.01 CLASS 3 SEVERE OBESITY DUE TO EXCESS CALORIES WITH SERIOUS COMORBIDITY AND BODY MASS INDEX (BMI) OF 45.0 TO 49.9 IN ADULT: Chronic | ICD-10-CM

## 2023-12-15 DIAGNOSIS — E11.9 TYPE 2 DIABETES MELLITUS WITHOUT COMPLICATION, WITHOUT LONG-TERM CURRENT USE OF INSULIN: Primary | Chronic | ICD-10-CM

## 2023-12-15 DIAGNOSIS — E78.2 MIXED HYPERLIPIDEMIA: Chronic | ICD-10-CM

## 2023-12-15 PROBLEM — U07.1 COVID-19: Status: RESOLVED | Noted: 2022-06-20 | Resolved: 2023-12-15

## 2023-12-15 LAB
BASOPHILS # BLD AUTO: 0.09 K/UL (ref 0–0.2)
BASOPHILS NFR BLD AUTO: 1.1 % (ref 0–1)
BILIRUB UR QL STRIP: NEGATIVE
CHOLEST SERPL-MCNC: 129 MG/DL (ref 0–200)
CHOLEST/HDLC SERPL: 3.8 {RATIO}
CLARITY UR: CLEAR
COLOR UR: YELLOW
CREAT UR-MCNC: 281 MG/DL (ref 39–259)
DIFFERENTIAL METHOD BLD: ABNORMAL
EOSINOPHIL # BLD AUTO: 0.25 K/UL (ref 0–0.5)
EOSINOPHIL NFR BLD AUTO: 3 % (ref 1–4)
ERYTHROCYTE [DISTWIDTH] IN BLOOD BY AUTOMATED COUNT: 14.1 % (ref 11.5–14.5)
EST. AVERAGE GLUCOSE BLD GHB EST-MCNC: 108 MG/DL
GLUCOSE UR STRIP-MCNC: NORMAL MG/DL
HBA1C MFR BLD HPLC: 5.4 % (ref 4.5–6.6)
HCT VFR BLD AUTO: 43.4 % (ref 40–54)
HDLC SERPL-MCNC: 34 MG/DL (ref 40–60)
HGB BLD-MCNC: 14.3 G/DL (ref 13.5–18)
IMM GRANULOCYTES # BLD AUTO: 0.02 K/UL (ref 0–0.04)
IMM GRANULOCYTES NFR BLD: 0.2 % (ref 0–0.4)
KETONES UR STRIP-SCNC: ABNORMAL MG/DL
LDLC SERPL CALC-MCNC: 67 MG/DL
LDLC/HDLC SERPL: 2 {RATIO}
LEUKOCYTE ESTERASE UR QL STRIP: NEGATIVE
LYMPHOCYTES # BLD AUTO: 2.31 K/UL (ref 1–4.8)
LYMPHOCYTES NFR BLD AUTO: 28 % (ref 27–41)
MCH RBC QN AUTO: 30.6 PG (ref 27–31)
MCHC RBC AUTO-ENTMCNC: 32.9 G/DL (ref 32–36)
MCV RBC AUTO: 92.7 FL (ref 80–96)
MICROALBUMIN UR-MCNC: 2.9 MG/DL (ref 0–2.8)
MICROALBUMIN/CREAT RATIO PNL UR: 10.3 MG/G (ref 0–30)
MONOCYTES # BLD AUTO: 0.68 K/UL (ref 0–0.8)
MONOCYTES NFR BLD AUTO: 8.2 % (ref 2–6)
MPC BLD CALC-MCNC: 12.1 FL (ref 9.4–12.4)
NEUTROPHILS # BLD AUTO: 4.9 K/UL (ref 1.8–7.7)
NEUTROPHILS NFR BLD AUTO: 59.5 % (ref 53–65)
NITRITE UR QL STRIP: NEGATIVE
NONHDLC SERPL-MCNC: 95 MG/DL
NRBC # BLD AUTO: 0 X10E3/UL
NRBC, AUTO (.00): 0 %
PH UR STRIP: 6 PH UNITS
PLATELET # BLD AUTO: 225 K/UL (ref 150–400)
PROT UR QL STRIP: 20
RBC # BLD AUTO: 4.68 M/UL (ref 4.6–6.2)
RBC # UR STRIP: NEGATIVE /UL
SP GR UR STRIP: 1.03
TRIGL SERPL-MCNC: 140 MG/DL (ref 35–150)
UROBILINOGEN UR STRIP-ACNC: NORMAL MG/DL
VLDLC SERPL-MCNC: 28 MG/DL
WBC # BLD AUTO: 8.25 K/UL (ref 4.5–11)

## 2023-12-15 PROCEDURE — 1159F MED LIST DOCD IN RCRD: CPT | Mod: CPTII,,, | Performed by: FAMILY MEDICINE

## 2023-12-15 PROCEDURE — 83036 HEMOGLOBIN A1C: ICD-10-PCS | Mod: ,,, | Performed by: CLINICAL MEDICAL LABORATORY

## 2023-12-15 PROCEDURE — 85025 COMPLETE CBC W/AUTO DIFF WBC: CPT | Mod: ,,, | Performed by: CLINICAL MEDICAL LABORATORY

## 2023-12-15 PROCEDURE — 3044F PR MOST RECENT HEMOGLOBIN A1C LEVEL <7.0%: ICD-10-PCS | Mod: CPTII,,, | Performed by: FAMILY MEDICINE

## 2023-12-15 PROCEDURE — 1160F PR REVIEW ALL MEDS BY PRESCRIBER/CLIN PHARMACIST DOCUMENTED: ICD-10-PCS | Mod: CPTII,,, | Performed by: FAMILY MEDICINE

## 2023-12-15 PROCEDURE — 1159F PR MEDICATION LIST DOCUMENTED IN MEDICAL RECORD: ICD-10-PCS | Mod: CPTII,,, | Performed by: FAMILY MEDICINE

## 2023-12-15 PROCEDURE — 1160F RVW MEDS BY RX/DR IN RCRD: CPT | Mod: CPTII,,, | Performed by: FAMILY MEDICINE

## 2023-12-15 PROCEDURE — 83036 HEMOGLOBIN GLYCOSYLATED A1C: CPT | Mod: ,,, | Performed by: CLINICAL MEDICAL LABORATORY

## 2023-12-15 PROCEDURE — 82043 UR ALBUMIN QUANTITATIVE: CPT | Mod: ,,, | Performed by: CLINICAL MEDICAL LABORATORY

## 2023-12-15 PROCEDURE — 4010F ACE/ARB THERAPY RXD/TAKEN: CPT | Mod: CPTII,,, | Performed by: FAMILY MEDICINE

## 2023-12-15 PROCEDURE — 82570 MICROALBUMIN / CREATININE RATIO URINE: ICD-10-PCS | Mod: ,,, | Performed by: CLINICAL MEDICAL LABORATORY

## 2023-12-15 PROCEDURE — 90471 IMMUNIZATION ADMIN: CPT | Mod: ,,, | Performed by: FAMILY MEDICINE

## 2023-12-15 PROCEDURE — 85025 CBC WITH DIFFERENTIAL: ICD-10-PCS | Mod: ,,, | Performed by: CLINICAL MEDICAL LABORATORY

## 2023-12-15 PROCEDURE — 3008F PR BODY MASS INDEX (BMI) DOCUMENTED: ICD-10-PCS | Mod: CPTII,,, | Performed by: FAMILY MEDICINE

## 2023-12-15 PROCEDURE — 3008F BODY MASS INDEX DOCD: CPT | Mod: CPTII,,, | Performed by: FAMILY MEDICINE

## 2023-12-15 PROCEDURE — 82043 MICROALBUMIN / CREATININE RATIO URINE: ICD-10-PCS | Mod: ,,, | Performed by: CLINICAL MEDICAL LABORATORY

## 2023-12-15 PROCEDURE — 3061F NEG MICROALBUMINURIA REV: CPT | Mod: CPTII,,, | Performed by: FAMILY MEDICINE

## 2023-12-15 PROCEDURE — 80061 LIPID PANEL: CPT | Mod: ,,, | Performed by: CLINICAL MEDICAL LABORATORY

## 2023-12-15 PROCEDURE — 3074F SYST BP LT 130 MM HG: CPT | Mod: CPTII,,, | Performed by: FAMILY MEDICINE

## 2023-12-15 PROCEDURE — 3044F HG A1C LEVEL LT 7.0%: CPT | Mod: CPTII,,, | Performed by: FAMILY MEDICINE

## 2023-12-15 PROCEDURE — 4010F PR ACE/ARB THEARPY RXD/TAKEN: ICD-10-PCS | Mod: CPTII,,, | Performed by: FAMILY MEDICINE

## 2023-12-15 PROCEDURE — 3078F DIAST BP <80 MM HG: CPT | Mod: CPTII,,, | Performed by: FAMILY MEDICINE

## 2023-12-15 PROCEDURE — 3066F PR DOCUMENTATION OF TREATMENT FOR NEPHROPATHY: ICD-10-PCS | Mod: CPTII,,, | Performed by: FAMILY MEDICINE

## 2023-12-15 PROCEDURE — 81003 URINALYSIS AUTO W/O SCOPE: CPT | Mod: QW,,, | Performed by: CLINICAL MEDICAL LABORATORY

## 2023-12-15 PROCEDURE — 82570 ASSAY OF URINE CREATININE: CPT | Mod: ,,, | Performed by: CLINICAL MEDICAL LABORATORY

## 2023-12-15 PROCEDURE — 90471 PNEUMOCOCCAL CONJUGATE VACCINE 20-VALENT: ICD-10-PCS | Mod: ,,, | Performed by: FAMILY MEDICINE

## 2023-12-15 PROCEDURE — 81003 URINALYSIS, REFLEX TO URINE CULTURE: ICD-10-PCS | Mod: QW,,, | Performed by: CLINICAL MEDICAL LABORATORY

## 2023-12-15 PROCEDURE — 90677 PNEUMOCOCCAL CONJUGATE VACCINE 20-VALENT: ICD-10-PCS | Mod: ,,, | Performed by: FAMILY MEDICINE

## 2023-12-15 PROCEDURE — 99214 PR OFFICE/OUTPT VISIT, EST, LEVL IV, 30-39 MIN: ICD-10-PCS | Mod: 25,,, | Performed by: FAMILY MEDICINE

## 2023-12-15 PROCEDURE — 3078F PR MOST RECENT DIASTOLIC BLOOD PRESSURE < 80 MM HG: ICD-10-PCS | Mod: CPTII,,, | Performed by: FAMILY MEDICINE

## 2023-12-15 PROCEDURE — 3074F PR MOST RECENT SYSTOLIC BLOOD PRESSURE < 130 MM HG: ICD-10-PCS | Mod: CPTII,,, | Performed by: FAMILY MEDICINE

## 2023-12-15 PROCEDURE — 3061F PR NEG MICROALBUMINURIA RESULT DOCUMENTED/REVIEW: ICD-10-PCS | Mod: CPTII,,, | Performed by: FAMILY MEDICINE

## 2023-12-15 PROCEDURE — 99214 OFFICE O/P EST MOD 30 MIN: CPT | Mod: 25,,, | Performed by: FAMILY MEDICINE

## 2023-12-15 PROCEDURE — 90677 PCV20 VACCINE IM: CPT | Mod: ,,, | Performed by: FAMILY MEDICINE

## 2023-12-15 PROCEDURE — 3066F NEPHROPATHY DOC TX: CPT | Mod: CPTII,,, | Performed by: FAMILY MEDICINE

## 2023-12-15 PROCEDURE — 80061 LIPID PANEL: ICD-10-PCS | Mod: ,,, | Performed by: CLINICAL MEDICAL LABORATORY

## 2023-12-15 RX ORDER — LISINOPRIL 40 MG/1
40 TABLET ORAL DAILY
Qty: 90 TABLET | Refills: 1 | Status: SHIPPED | OUTPATIENT
Start: 2023-12-15

## 2023-12-15 RX ORDER — HYDROCHLOROTHIAZIDE 12.5 MG/1
12.5 TABLET ORAL DAILY
Qty: 90 TABLET | Refills: 1 | Status: SHIPPED | OUTPATIENT
Start: 2023-12-15

## 2023-12-15 RX ORDER — NABUMETONE 750 MG/1
TABLET, FILM COATED ORAL
Qty: 180 TABLET | Refills: 1 | Status: SHIPPED | OUTPATIENT
Start: 2023-12-15

## 2023-12-15 RX ORDER — AMLODIPINE BESYLATE 10 MG/1
TABLET ORAL
Qty: 90 TABLET | Refills: 1 | Status: SHIPPED | OUTPATIENT
Start: 2023-12-15

## 2023-12-15 RX ORDER — ATORVASTATIN CALCIUM 40 MG/1
40 TABLET, FILM COATED ORAL NIGHTLY
Qty: 90 TABLET | Refills: 1 | Status: SHIPPED | OUTPATIENT
Start: 2023-12-15

## 2023-12-15 RX ORDER — METFORMIN HYDROCHLORIDE 1000 MG/1
1000 TABLET ORAL 2 TIMES DAILY
Qty: 180 TABLET | Refills: 1 | Status: SHIPPED | OUTPATIENT
Start: 2023-12-15

## 2023-12-15 RX ORDER — CARVEDILOL 25 MG/1
TABLET ORAL
Qty: 180 TABLET | Refills: 1 | Status: SHIPPED | OUTPATIENT
Start: 2023-12-15

## 2023-12-15 NOTE — ASSESSMENT & PLAN NOTE
- Check glucose outside of clinic, record numbers, and bring log to follow up visit.    - Take medications as directed, and bring all medications to every clinic appointment.    - Eat a diabetic diet, if there are concerns about what that entails, we have a diabetic educator in our clinic.    - Cardiovascular exercise at least 3 times per week, for at least 15 minutes.    - Patient should be on a Statin medication, unless patient cannot tolerate a Statin.    - Aspirin should be taken everyday,  81mg, unless a higher dose is indicated for other medicaiton conditions. Also do not recommend Aspirin for a patient with known adverse effects from Aspirin.    - Recommend yearly, dilated eye exams for all Diabetic Patients.    - Monitor feet for calluses, abrasion, or other abnormalities. Report any concerns at every clinic visit.    -   Hemoglobin A1C   Date Value Ref Range Status   06/23/2023 5.9 4.5 - 6.6 % Final     Comment:       Normal:               <5.7%  Pre-Diabetic:       5.7% to 6.4%  Diabetic:             >6.4%  Diabetic Goal:     <7%   12/28/2022 5.8 4.5 - 6.6 % Final     Comment:       Normal:               <5.7%  Pre-Diabetic:       5.7% to 6.4%  Diabetic:             >6.4%  Diabetic Goal:     <7%   07/08/2022 6.3 4.5 - 6.6 % Final     Comment:       Normal:               <5.7%  Pre-Diabetic:       5.7% to 6.4%  Diabetic:             >6.4%  Diabetic Goal:     <7%

## 2023-12-15 NOTE — PROGRESS NOTES
Amol Gonzalez MD    [address] [place] [address]     PATIENT NAME: Kalpesh Saleem  : 1965  DATE: 12/15/23  MRN: [id number]      Billing Provider: Amol Gonzalez MD  Level of Service: [place] OFFICE/OUTPT VISIT, EST, LEVL IV, 30-39 MIN  Patient PCP Information       Provider PCP Type    Amol Gonzalez MD General            Reason for Visit / Chief Complaint: Follow-up (Here today for 6 month check up, needs medication refill, along with lab work. He is complaining of right fore arm having some numbness and tingling sensation occasionally. Symptoms started about two months ago, that progresses to a pain of 6/10.) and Health Maintenance (Patient is in agreement for pneumococcal vaccine. Patient has his eye exam scheduled for 24.)       Update PCP  Update Chief Complaint         History of Present Illness / Problem Focused Workflow     Kalpesh Saleem presents to the clinic with Follow-up (Here today for 6 month check up, needs medication refill, along with lab work. He is complaining of right fore arm having some numbness and tingling sensation occasionally. Symptoms started about two months ago, that progresses to a pain of 6/10.) and Health Maintenance (Patient is in agreement for pneumococcal vaccine. Patient has his eye exam scheduled for 24.)     He complains of right arm numbness and tingling, started with some neck pain and then developed into numbness tingling in the right arm, these symptoms wax and wane and he is able to manage them fairly well         HPI    Review of Systems     Review of Systems   Constitutional:  Negative for activity change, appetite change, chills, fatigue and fever.   HENT:  Negative for nasal congestion, ear pain, hearing loss, postnasal drip and sore throat.    Respiratory:  Negative for cough, chest tightness, shortness of breath and wheezing.    Cardiovascular:  Negative for chest pain, palpitations, leg  swelling and claudication.   Gastrointestinal:  Negative for abdominal pain, change in bowel habit, constipation, diarrhea, nausea and vomiting.   Genitourinary:  Negative for dysuria.   Musculoskeletal:  Negative for arthralgias, back pain, gait problem and myalgias.   Integumentary:  Negative for rash.   Neurological:  Negative for weakness and headaches.   Psychiatric/Behavioral:  Negative for suicidal ideas. The patient is not nervous/anxious.         Medical / Social / Family History     Past Medical History:   Diagnosis Date    Diabetes mellitus, type 2     Hyperlipidemia     Hypertension     Sleep apnea        History reviewed. No pertinent surgical history.    Social History    reports that he has quit smoking. He has never been exposed to tobacco smoke. He has never used smokeless tobacco. He reports current alcohol use of about 8.0 standard drinks of alcohol per week. He reports that he does not use drugs.    Family History  's family history includes Diabetes in his mother.    Medications and Allergies     Medications  Outpatient Medications Marked as Taking for the 12/15/23 encounter (Office Visit) with Amol Gonzalez MD   Medication Sig Dispense Refill    aspirin 81 MG Chew Take 81 mg by mouth once daily.      [DISCONTINUED] amLODIPine (NORVASC) 10 MG tablet TAKE 1 TABLET BY MOUTH EVERY DAY FOR BLOOD PRESSURE 90 tablet 1    [DISCONTINUED] atorvastatin (LIPITOR) 40 MG tablet Take 1 tablet (40 mg total) by mouth nightly. For CHOLESTEROL 90 tablet 1    [DISCONTINUED] carvediloL (COREG) 25 MG tablet TAKE 1 TABLET BY MOUTH 2 TIMES DAILY. FOR BLOOD PRESSURE 180 tablet 1    [DISCONTINUED] hydroCHLOROthiazide (HYDRODIURIL) 12.5 MG Tab Take 1 tablet (12.5 mg total) by mouth once daily. FOR BLOOD PRESSURE 90 tablet 1    [DISCONTINUED] lisinopriL (PRINIVIL,ZESTRIL) 40 MG tablet Take 1 tablet (40 mg total) by mouth once daily. FOR BLOOD PRESSURE 90 tablet 1    [DISCONTINUED] metFORMIN (GLUCOPHAGE) 1000  MG tablet Take 1 tablet (1,000 mg total) by mouth 2 (two) times daily. FOR DIABETES 180 tablet 1    [DISCONTINUED] nabumetone (RELAFEN) 750 MG tablet TAKE 2 TABLETS BY MOUTH EVERY DAY AFTER SUPPER FOR PAIN AND INFLAMMATION 180 tablet 1    [DISCONTINUED] tirzepatide 10 mg/0.5 mL PnIj Inject 10 mg into the skin every 7 days. 4 pen 5       Allergies  Review of patient's allergies indicates:  No Known Allergies    Physical Examination     Vitals:    12/15/23 0805   BP: 113/74   Pulse: 82   Resp: 20   Temp: 97.9 °F (36.6 °C)     Physical Exam  Vitals and nursing note reviewed.   Constitutional:       General: He is not in acute distress.     Appearance: Normal appearance. He is not ill-appearing.   Eyes:      Extraocular Movements: Extraocular movements intact.      Pupils: Pupils are equal, round, and reactive to light.   Cardiovascular:      Rate and Rhythm: Normal rate and regular rhythm.      Heart sounds: Normal heart sounds.   Pulmonary:      Effort: Pulmonary effort is normal.      Breath sounds: Normal breath sounds.   Abdominal:      General: Bowel sounds are normal.      Palpations: Abdomen is soft.   Musculoskeletal:         General: Normal range of motion.   Skin:     Findings: No rash.   Neurological:      General: No focal deficit present.      Mental Status: He is alert and oriented to person, place, and time. Mental status is at baseline.   Psychiatric:         Mood and Affect: Mood normal.         Behavior: Behavior normal.            Assessment and Plan (including Health Maintenance)      Problem List  Smart Sets  Document Outside HM   :    Plan:     His right arm symptoms sound like cervical disc problems with intermittent nerve impingement. Topical voltaren, stretching and symptom management for now. If symptoms progress he will let me know.     Medications refilled, labs today. From a chronic health standpoint he is doing well, weight is down close to 20 pounds since his last visit, his goal is  another 20+ pound by his next visit.         Health Maintenance Due   Topic Date Due    TETANUS VACCINE  Never done    COVID-19 Vaccine (3 - 2023-24 season) 09/01/2023    Hemoglobin A1c  12/23/2023    Eye Exam  01/27/2024       Problem List Items Addressed This Visit          Cardiac/Vascular    Essential hypertension, benign (Chronic)    Overview      - Goal blood pressure for most patients is less than 130/85. Both numbers are important. If you have questions about your specific goal blood pressure, please ask at your clinic visits.   - Check blood pressure outside of clinic, record the numbers, and bring the log to all your office visits.   - If you have any chest pain, SOB, or other concerning symptoms, report these immediately, or go to the nearest ER.    - Recommend cardiovascular exercise, at least 3 times per week, for at least 15 minutes.   - Eat a heart healthy diet.            Relevant Medications    lisinopriL (PRINIVIL,ZESTRIL) 40 MG tablet    hydroCHLOROthiazide (HYDRODIURIL) 12.5 MG Tab    amLODIPine (NORVASC) 10 MG tablet    carvediloL (COREG) 25 MG tablet    Other Relevant Orders    Lipid Panel    Microalbumin/Creatinine Ratio, Urine    Urinalysis, Reflex to Urine Culture    CBC Auto Differential    Mixed hyperlipidemia (Chronic)    Relevant Orders    Lipid Panel       Endocrine    Type 2 diabetes mellitus without complication, without long-term current use of insulin - Primary (Chronic)    Current Assessment & Plan      - Check glucose outside of clinic, record numbers, and bring log to follow up visit.    - Take medications as directed, and bring all medications to every clinic appointment.    - Eat a diabetic diet, if there are concerns about what that entails, we have a diabetic educator in our clinic.    - Cardiovascular exercise at least 3 times per week, for at least 15 minutes.    - Patient should be on a Statin medication, unless patient cannot tolerate a Statin.    - Aspirin should be  taken everyday,  81mg, unless a higher dose is indicated for other medicaiton conditions. Also do not recommend Aspirin for a patient with known adverse effects from Aspirin.    - Recommend yearly, dilated eye exams for all Diabetic Patients.    - Monitor feet for calluses, abrasion, or other abnormalities. Report any concerns at every clinic visit.    -   Hemoglobin A1C   Date Value Ref Range Status   06/23/2023 5.9 4.5 - 6.6 % Final     Comment:       Normal:               <5.7%  Pre-Diabetic:       5.7% to 6.4%  Diabetic:             >6.4%  Diabetic Goal:     <7%   12/28/2022 5.8 4.5 - 6.6 % Final     Comment:       Normal:               <5.7%  Pre-Diabetic:       5.7% to 6.4%  Diabetic:             >6.4%  Diabetic Goal:     <7%   07/08/2022 6.3 4.5 - 6.6 % Final     Comment:       Normal:               <5.7%  Pre-Diabetic:       5.7% to 6.4%  Diabetic:             >6.4%  Diabetic Goal:     <7%               Relevant Medications    atorvastatin (LIPITOR) 40 MG tablet    metFORMIN (GLUCOPHAGE) 1000 MG tablet    Other Relevant Orders    Lipid Panel    Microalbumin/Creatinine Ratio, Urine    Urinalysis, Reflex to Urine Culture    CBC Auto Differential    Hemoglobin A1C    Class 3 severe obesity due to excess calories with serious comorbidity and body mass index (BMI) of 45.0 to 49.9 in adult (Chronic)       Orthopedic    Inflammation of joint    Relevant Medications    nabumetone (RELAFEN) 750 MG tablet     Other Visit Diagnoses       Encounter for immunization        Relevant Orders    (In Office Administered) Pneumococcal Conjugate Vaccine (20 Valent) (IM) (Preferred) (Completed)            Health Maintenance Topics with due status: Not Due       Topic Last Completion Date    Colorectal Cancer Screening 02/16/2022    Diabetes Urine Screening 06/23/2023    Foot Exam 06/23/2023    Lipid Panel 06/23/2023    Shingles Vaccine 10/31/2023    Low Dose Statin 12/15/2023       Procedures     Future Appointments   Date Time  Provider Department Center   6/14/2024  8:00 AM Amol Gonzalez MD [facility]        Follow up in about 6 months (around 6/15/2024) for chronic health problems.     Signature:  Amol Gonzalez MD  [address] [place] [address]  Phone #: [phone]  Fax #: [phone]    Date of encounter: 12/15/23    There are no Patient Instructions on file for this visit.

## 2024-01-04 DIAGNOSIS — E11.9 TYPE 2 DIABETES MELLITUS WITHOUT COMPLICATION, WITHOUT LONG-TERM CURRENT USE OF INSULIN: Primary | Chronic | ICD-10-CM

## 2024-01-04 RX ORDER — TIRZEPATIDE 10 MG/.5ML
INJECTION, SOLUTION SUBCUTANEOUS
Qty: 4 PEN | Refills: 0 | Status: SHIPPED | OUTPATIENT
Start: 2024-01-04

## 2024-01-29 LAB
LEFT EYE DM RETINOPATHY: NEGATIVE
RIGHT EYE DM RETINOPATHY: NEGATIVE

## 2024-02-15 DIAGNOSIS — E11.9 TYPE 2 DIABETES MELLITUS WITHOUT COMPLICATION, WITHOUT LONG-TERM CURRENT USE OF INSULIN: Chronic | ICD-10-CM

## 2024-02-15 RX ORDER — TIRZEPATIDE 5 MG/.5ML
INJECTION, SOLUTION SUBCUTANEOUS
Qty: 4 PEN | Refills: 5 | Status: SHIPPED | OUTPATIENT
Start: 2024-02-15 | End: 2024-06-14

## 2024-02-20 ENCOUNTER — PATIENT MESSAGE (OUTPATIENT)
Dept: ADMINISTRATIVE | Facility: HOSPITAL | Age: 59
End: 2024-02-20

## 2024-02-21 ENCOUNTER — PATIENT OUTREACH (OUTPATIENT)
Dept: ADMINISTRATIVE | Facility: HOSPITAL | Age: 59
End: 2024-02-21

## 2024-02-21 NOTE — LETTER
AUTHORIZATION FOR RELEASE OF   CONFIDENTIAL INFORMATION    Dear [facility],    We are seeing Kalpesh Saleem, date of birth 1965, in the clinic at [facility]. Amol Gonzalez MD is the patient's PCP. Kalpesh Saleem has an outstanding lab/procedure at the time we reviewed his chart. In order to help keep his health information updated, he has authorized us to request the following medical record(s):        (  )  MAMMOGRAM                                      (  )  COLONOSCOPY      (  )  PAP SMEAR                                          (  )  OUTSIDE LAB RESULTS     (  )  DEXA SCAN                                          ( x )  EYE EXAM            (  )  FOOT EXAM                                          (  )  ENTIRE RECORD     (  )  OUTSIDE IMMUNIZATIONS                 (  )  _______________         Please fax records to Magda Avelar LPN Care Coordinator at [phone].      If you have any questions, please contact Magda at [phone].           Patient Name: Kalpesh Saleem  : 1965  Patient Phone #: [phone]

## 2024-02-21 NOTE — PROGRESS NOTES
Population Health Chart Review & Patient Outreach Details    Campaign Outreach    Updates Requested / Reviewed:  [x]  Care Team Updated    Health Maintenance Topics Addressed and Outreach Outcomes / Actions Taken:  Diabetic Eye Exam [x] Campaign Outreach. RISHABH sent to [facility].

## 2024-03-01 ENCOUNTER — PATIENT OUTREACH (OUTPATIENT)
Dept: ADMINISTRATIVE | Facility: HOSPITAL | Age: 59
End: 2024-03-01

## 2024-03-01 NOTE — PROGRESS NOTES
Population Health Chart Review & Patient Outreach Details    Campaign Outreach    Updates Requested / Reviewed:  [x]  Care Team Updated    Health Maintenance Topics Addressed and Outreach Outcomes / Actions Taken:  Diabetic Eye Exam [x] HM Updated with January 2024 Eye Exam (Dr. Cuello). History Updated.

## 2024-06-09 DIAGNOSIS — E11.9 TYPE 2 DIABETES MELLITUS WITHOUT COMPLICATION, WITHOUT LONG-TERM CURRENT USE OF INSULIN: Chronic | ICD-10-CM

## 2024-06-10 RX ORDER — METFORMIN HYDROCHLORIDE 1000 MG/1
TABLET ORAL
Qty: 60 TABLET | Refills: 0 | Status: SHIPPED | OUTPATIENT
Start: 2024-06-10 | End: 2024-06-14 | Stop reason: SDUPTHER

## 2024-06-13 DIAGNOSIS — I10 ESSENTIAL HYPERTENSION, BENIGN: Chronic | ICD-10-CM

## 2024-06-13 RX ORDER — CARVEDILOL 25 MG/1
TABLET ORAL
Qty: 60 TABLET | Refills: 0 | Status: SHIPPED | OUTPATIENT
Start: 2024-06-13 | End: 2024-06-14 | Stop reason: SDUPTHER

## 2024-06-14 ENCOUNTER — OFFICE VISIT (OUTPATIENT)
Dept: FAMILY MEDICINE | Facility: CLINIC | Age: 59
End: 2024-06-14
Payer: COMMERCIAL

## 2024-06-14 VITALS
BODY MASS INDEX: 44.1 KG/M2 | WEIGHT: 291 LBS | OXYGEN SATURATION: 95 % | DIASTOLIC BLOOD PRESSURE: 71 MMHG | SYSTOLIC BLOOD PRESSURE: 106 MMHG | HEART RATE: 75 BPM | TEMPERATURE: 98 F | HEIGHT: 68 IN

## 2024-06-14 DIAGNOSIS — N52.8 OTHER MALE ERECTILE DYSFUNCTION: Chronic | ICD-10-CM

## 2024-06-14 DIAGNOSIS — I10 ESSENTIAL HYPERTENSION, BENIGN: ICD-10-CM

## 2024-06-14 DIAGNOSIS — E11.9 TYPE 2 DIABETES MELLITUS WITHOUT COMPLICATION, WITHOUT LONG-TERM CURRENT USE OF INSULIN: Primary | Chronic | ICD-10-CM

## 2024-06-14 DIAGNOSIS — E78.2 MIXED HYPERLIPIDEMIA: Chronic | ICD-10-CM

## 2024-06-14 LAB
BASOPHILS # BLD AUTO: 0.16 K/UL (ref 0–0.2)
BASOPHILS NFR BLD AUTO: 2 % (ref 0–1)
CHOLEST SERPL-MCNC: 128 MG/DL (ref 0–200)
CHOLEST/HDLC SERPL: 3.6 {RATIO}
CREAT UR-MCNC: 110 MG/DL (ref 39–259)
DIFFERENTIAL METHOD BLD: ABNORMAL
EOSINOPHIL # BLD AUTO: 0.26 K/UL (ref 0–0.5)
EOSINOPHIL NFR BLD AUTO: 3.3 % (ref 1–4)
ERYTHROCYTE [DISTWIDTH] IN BLOOD BY AUTOMATED COUNT: 13.9 % (ref 11.5–14.5)
EST. AVERAGE GLUCOSE BLD GHB EST-MCNC: 117 MG/DL
HBA1C MFR BLD HPLC: 5.7 % (ref 4.5–6.6)
HCT VFR BLD AUTO: 41 % (ref 40–54)
HDLC SERPL-MCNC: 36 MG/DL (ref 40–60)
HGB BLD-MCNC: 13.2 G/DL (ref 13.5–18)
IMM GRANULOCYTES # BLD AUTO: 0.04 K/UL (ref 0–0.04)
IMM GRANULOCYTES NFR BLD: 0.5 % (ref 0–0.4)
LDLC SERPL CALC-MCNC: 72 MG/DL
LDLC/HDLC SERPL: 2 {RATIO}
LYMPHOCYTES # BLD AUTO: 1.88 K/UL (ref 1–4.8)
LYMPHOCYTES NFR BLD AUTO: 24 % (ref 27–41)
MCH RBC QN AUTO: 29.9 PG (ref 27–31)
MCHC RBC AUTO-ENTMCNC: 32.2 G/DL (ref 32–36)
MCV RBC AUTO: 93 FL (ref 80–96)
MICROALBUMIN UR-MCNC: 0.6 MG/DL (ref 0–2.8)
MICROALBUMIN/CREAT RATIO PNL UR: 5.5 MG/G (ref 0–30)
MONOCYTES # BLD AUTO: 0.57 K/UL (ref 0–0.8)
MONOCYTES NFR BLD AUTO: 7.3 % (ref 2–6)
MPC BLD CALC-MCNC: 11.4 FL (ref 9.4–12.4)
NEUTROPHILS # BLD AUTO: 4.91 K/UL (ref 1.8–7.7)
NEUTROPHILS NFR BLD AUTO: 62.9 % (ref 53–65)
NONHDLC SERPL-MCNC: 92 MG/DL
NRBC # BLD AUTO: 0 X10E3/UL
NRBC, AUTO (.00): 0 %
PLATELET # BLD AUTO: 213 K/UL (ref 150–400)
RBC # BLD AUTO: 4.41 M/UL (ref 4.6–6.2)
TRIGL SERPL-MCNC: 102 MG/DL (ref 35–150)
VLDLC SERPL-MCNC: 20 MG/DL
WBC # BLD AUTO: 7.82 K/UL (ref 4.5–11)

## 2024-06-14 PROCEDURE — 82043 UR ALBUMIN QUANTITATIVE: CPT | Mod: ,,, | Performed by: CLINICAL MEDICAL LABORATORY

## 2024-06-14 PROCEDURE — 80061 LIPID PANEL: CPT | Mod: ,,, | Performed by: CLINICAL MEDICAL LABORATORY

## 2024-06-14 PROCEDURE — 4010F ACE/ARB THERAPY RXD/TAKEN: CPT | Mod: CPTII,,, | Performed by: FAMILY MEDICINE

## 2024-06-14 PROCEDURE — 2023F DILAT RTA XM W/O RTNOPTHY: CPT | Mod: CPTII,,, | Performed by: FAMILY MEDICINE

## 2024-06-14 PROCEDURE — 82570 ASSAY OF URINE CREATININE: CPT | Mod: ,,, | Performed by: CLINICAL MEDICAL LABORATORY

## 2024-06-14 PROCEDURE — 1160F RVW MEDS BY RX/DR IN RCRD: CPT | Mod: CPTII,,, | Performed by: FAMILY MEDICINE

## 2024-06-14 PROCEDURE — 3078F DIAST BP <80 MM HG: CPT | Mod: CPTII,,, | Performed by: FAMILY MEDICINE

## 2024-06-14 PROCEDURE — 85025 COMPLETE CBC W/AUTO DIFF WBC: CPT | Mod: ,,, | Performed by: CLINICAL MEDICAL LABORATORY

## 2024-06-14 PROCEDURE — 99214 OFFICE O/P EST MOD 30 MIN: CPT | Mod: ,,, | Performed by: FAMILY MEDICINE

## 2024-06-14 PROCEDURE — 3074F SYST BP LT 130 MM HG: CPT | Mod: CPTII,,, | Performed by: FAMILY MEDICINE

## 2024-06-14 PROCEDURE — 83036 HEMOGLOBIN GLYCOSYLATED A1C: CPT | Mod: ,,, | Performed by: CLINICAL MEDICAL LABORATORY

## 2024-06-14 PROCEDURE — 1159F MED LIST DOCD IN RCRD: CPT | Mod: CPTII,,, | Performed by: FAMILY MEDICINE

## 2024-06-14 PROCEDURE — 3008F BODY MASS INDEX DOCD: CPT | Mod: CPTII,,, | Performed by: FAMILY MEDICINE

## 2024-06-14 RX ORDER — SILDENAFIL 100 MG/1
100 TABLET, FILM COATED ORAL DAILY PRN
Qty: 30 TABLET | Refills: 2 | Status: SHIPPED | OUTPATIENT
Start: 2024-06-14 | End: 2025-06-14

## 2024-06-14 RX ORDER — ATORVASTATIN CALCIUM 40 MG/1
40 TABLET, FILM COATED ORAL NIGHTLY
Qty: 90 TABLET | Refills: 1 | Status: SHIPPED | OUTPATIENT
Start: 2024-06-14

## 2024-06-14 RX ORDER — METFORMIN HYDROCHLORIDE 1000 MG/1
1000 TABLET ORAL 2 TIMES DAILY WITH MEALS
Qty: 180 TABLET | Refills: 1 | Status: SHIPPED | OUTPATIENT
Start: 2024-06-14

## 2024-06-14 RX ORDER — TIRZEPATIDE 10 MG/.5ML
10 INJECTION, SOLUTION SUBCUTANEOUS WEEKLY
Qty: 6 ML | Refills: 1 | Status: SHIPPED | OUTPATIENT
Start: 2024-06-14

## 2024-06-14 RX ORDER — HYDROCHLOROTHIAZIDE 12.5 MG/1
12.5 TABLET ORAL DAILY
Qty: 90 TABLET | Refills: 1 | Status: SHIPPED | OUTPATIENT
Start: 2024-06-14

## 2024-06-14 RX ORDER — AMLODIPINE BESYLATE 10 MG/1
TABLET ORAL
Qty: 90 TABLET | Refills: 1 | Status: SHIPPED | OUTPATIENT
Start: 2024-06-14

## 2024-06-14 RX ORDER — LISINOPRIL 40 MG/1
40 TABLET ORAL DAILY
Qty: 90 TABLET | Refills: 1 | Status: SHIPPED | OUTPATIENT
Start: 2024-06-14

## 2024-06-14 RX ORDER — CARVEDILOL 25 MG/1
TABLET ORAL
Qty: 180 TABLET | Refills: 1 | Status: SHIPPED | OUTPATIENT
Start: 2024-06-14

## 2024-06-14 NOTE — ASSESSMENT & PLAN NOTE
- Check glucose outside of clinic, record numbers, and bring log to follow up visit.    - Take medications as directed, and bring all medications to every clinic appointment.    - Eat a diabetic diet, if there are concerns about what that entails, we have a diabetic educator in our clinic.    - Cardiovascular exercise at least 3 times per week, for at least 15 minutes.    - Patient should be on a Statin medication, unless patient cannot tolerate a Statin.    - Aspirin should be taken everyday,  81mg, unless a higher dose is indicated for other medicaiton conditions. Also do not recommend Aspirin for a patient with known adverse effects from Aspirin.    - Recommend yearly, dilated eye exams for all Diabetic Patients.    - Monitor feet for calluses, abrasion, or other abnormalities. Report any concerns at every clinic visit.    -   Hemoglobin A1C   Date Value Ref Range Status   12/15/2023 5.4 4.5 - 6.6 % Final     Comment:       Normal:               <5.7%  Pre-Diabetic:       5.7% to 6.4%  Diabetic:             >6.4%  Diabetic Goal:     <7%   06/23/2023 5.9 4.5 - 6.6 % Final     Comment:       Normal:               <5.7%  Pre-Diabetic:       5.7% to 6.4%  Diabetic:             >6.4%  Diabetic Goal:     <7%   12/28/2022 5.8 4.5 - 6.6 % Final     Comment:       Normal:               <5.7%  Pre-Diabetic:       5.7% to 6.4%  Diabetic:             >6.4%  Diabetic Goal:     <7%

## 2024-06-14 NOTE — PROGRESS NOTES
Amol Gonzalez MD    [address] [place] [address]     PATIENT NAME: Kalpesh Saleem  : 1965  DATE: 24  MRN: [id number]      Billing Provider: Amol Gonzalez MD  Level of Service: [place] OFFICE/OUTPT VISIT, EST, LEVL IV, 30-39 MIN  Patient PCP Information       Provider PCP Type    Amol Gonzalez MD General            Reason for Visit / Chief Complaint: Diabetes (6 month check up and medication refills. Patient states he was taking the high dose of Mounjaro but pharmacy only gave him the low dose. Requesting a prescription for the high dose. ), Hypertension, and Hyperlipidemia       Update PCP  Update Chief Complaint         History of Present Illness / Problem Focused Workflow     Kalpesh Saleem presents to the clinic with Diabetes (6 month check up and medication refills. Patient states he was taking the high dose of Mounjaro but pharmacy only gave him the low dose. Requesting a prescription for the high dose. ), Hypertension, and Hyperlipidemia     His blood pressure has been running a little low as he has lost weight     Mounjaro was the wrong dose the last time we sent it in    Diabetes is historically well controlled     Needs a little help with erections       HPI    Review of Systems     Review of Systems   Constitutional:  Negative for activity change, appetite change, chills, fatigue and fever.   HENT:  Negative for nasal congestion, ear pain, hearing loss, postnasal drip and sore throat.    Respiratory:  Negative for cough, chest tightness, shortness of breath and wheezing.    Cardiovascular:  Negative for chest pain, palpitations, leg swelling and claudication.   Gastrointestinal:  Negative for abdominal pain, change in bowel habit, constipation, diarrhea, nausea and vomiting.   Genitourinary:  Negative for dysuria.   Musculoskeletal:  Negative for arthralgias, back pain, gait problem and myalgias.   Integumentary:  Negative for rash.    Neurological:  Negative for weakness and headaches.   Psychiatric/Behavioral:  Negative for suicidal ideas. The patient is not nervous/anxious.         Medical / Social / Family History     Past Medical History:   Diagnosis Date    Diabetes mellitus, type 2     Diabetic eye exam 01/29/2024    Dr. Avery Cuello, [facility]    Hyperlipidemia     Hypertension     Sleep apnea        History reviewed. No pertinent surgical history.    Social History    reports that he has quit smoking. He has never been exposed to tobacco smoke. He has never used smokeless tobacco. He reports current alcohol use of about 8.0 standard drinks of alcohol per week. He reports that he does not use drugs.    Family History  's family history includes Diabetes in his mother.    Medications and Allergies     Medications  Outpatient Medications Marked as Taking for the 6/14/24 encounter (Office Visit) with Amol Gonzalez MD   Medication Sig Dispense Refill    amLODIPine (NORVASC) 10 MG tablet TAKE 1 TABLET BY MOUTH EVERY DAY FOR BLOOD PRESSURE 90 tablet 1    aspirin 81 MG Chew Take 81 mg by mouth once daily.      atorvastatin (LIPITOR) 40 MG tablet Take 1 tablet (40 mg total) by mouth nightly. For CHOLESTEROL 90 tablet 1    carvediloL (COREG) 25 MG tablet TAKE 1 TABLET BY MOUTH TWICE A DAY FOR BLOOD PRESSURE 60 tablet 0    hydroCHLOROthiazide (HYDRODIURIL) 12.5 MG Tab Take 1 tablet (12.5 mg total) by mouth once daily. FOR BLOOD PRESSURE 90 tablet 1    lisinopriL (PRINIVIL,ZESTRIL) 40 MG tablet Take 1 tablet (40 mg total) by mouth once daily. FOR BLOOD PRESSURE 90 tablet 1    metFORMIN (GLUCOPHAGE) 1000 MG tablet TAKE 1 TABLET (1,000MG TOTAL) BY MOUTH 2 (TWO) TIMES DAILY FOR DIABETES 60 tablet 0       Allergies  Review of patient's allergies indicates:  No Known Allergies    Physical Examination     Vitals:    06/14/24 0801   BP: 106/71   Pulse: 75   Temp: 98 °F (36.7 °C)     Physical Exam  Vitals and nursing note  reviewed.   Constitutional:       General: He is not in acute distress.     Appearance: Normal appearance. He is not ill-appearing.   Eyes:      Extraocular Movements: Extraocular movements intact.      Pupils: Pupils are equal, round, and reactive to light.   Cardiovascular:      Rate and Rhythm: Normal rate and regular rhythm.      Pulses:           Dorsalis pedis pulses are 2+ on the right side and 2+ on the left side.        Posterior tibial pulses are 2+ on the right side and 2+ on the left side.      Heart sounds: Normal heart sounds.   Pulmonary:      Effort: Pulmonary effort is normal.      Breath sounds: Normal breath sounds.   Abdominal:      General: Bowel sounds are normal.      Palpations: Abdomen is soft.   Musculoskeletal:         General: Normal range of motion.      Right foot: Normal range of motion. No deformity, bunion, Charcot foot, foot drop or prominent metatarsal heads.      Left foot: Normal range of motion. No deformity, bunion, Charcot foot, foot drop or prominent metatarsal heads.   Feet:      Right foot:      Protective Sensation: 10 sites tested.  10 sites sensed.      Skin integrity: Skin integrity normal.      Toenail Condition: Right toenails are normal.      Left foot:      Protective Sensation: 10 sites tested.  10 sites sensed.      Skin integrity: Skin integrity normal. No dry skin.      Toenail Condition: Left toenails are normal.   Skin:     General: Skin is warm.      Findings: No rash.   Neurological:      General: No focal deficit present.      Mental Status: He is alert and oriented to person, place, and time. Mental status is at baseline.   Psychiatric:         Mood and Affect: Mood normal.         Behavior: Behavior normal.            Assessment and Plan (including Health Maintenance)      Problem List  Smart Sets  Document Outside HM   :    Plan:     He is on the higher dose of mounjaro, I sent that in today    Nabumetone discontinued today, he will use OTC NSAIDs if  needed     IF systolic blood pressure remains below 120, he will take 1/2 of his lisinopril     Health Maintenance Due   Topic Date Due    TETANUS VACCINE  Never done    COVID-19 Vaccine (3 - 2023-24 season) 09/01/2023    Shingles Vaccine (2 of 2) 12/26/2023    Hemoglobin A1c  06/15/2024       Problem List Items Addressed This Visit          Cardiac/Vascular    Essential hypertension, benign (Chronic)    Overview      - Goal blood pressure for most patients is less than 130/85. Both numbers are important. If you have questions about your specific goal blood pressure, please ask at your clinic visits.   - Check blood pressure outside of clinic, record the numbers, and bring the log to all your office visits.   - If you have any chest pain, SOB, or other concerning symptoms, report these immediately, or go to the nearest ER.    - Recommend cardiovascular exercise, at least 3 times per week, for at least 15 minutes.   - Eat a heart healthy diet.            Mixed hyperlipidemia (Chronic)       Renal/    Other male erectile dysfunction (Chronic)       Endocrine    Type 2 diabetes mellitus without complication, without long-term current use of insulin - Primary (Chronic)    Current Assessment & Plan      - Check glucose outside of clinic, record numbers, and bring log to follow up visit.    - Take medications as directed, and bring all medications to every clinic appointment.    - Eat a diabetic diet, if there are concerns about what that entails, we have a diabetic educator in our clinic.    - Cardiovascular exercise at least 3 times per week, for at least 15 minutes.    - Patient should be on a Statin medication, unless patient cannot tolerate a Statin.    - Aspirin should be taken everyday,  81mg, unless a higher dose is indicated for other medicaiton conditions. Also do not recommend Aspirin for a patient with known adverse effects from Aspirin.    - Recommend yearly, dilated eye exams for all Diabetic Patients.     - Monitor feet for calluses, abrasion, or other abnormalities. Report any concerns at every clinic visit.    -   Hemoglobin A1C   Date Value Ref Range Status   12/15/2023 5.4 4.5 - 6.6 % Final     Comment:       Normal:               <5.7%  Pre-Diabetic:       5.7% to 6.4%  Diabetic:             >6.4%  Diabetic Goal:     <7%   06/23/2023 5.9 4.5 - 6.6 % Final     Comment:       Normal:               <5.7%  Pre-Diabetic:       5.7% to 6.4%  Diabetic:             >6.4%  Diabetic Goal:     <7%   12/28/2022 5.8 4.5 - 6.6 % Final     Comment:       Normal:               <5.7%  Pre-Diabetic:       5.7% to 6.4%  Diabetic:             >6.4%  Diabetic Goal:     <7%                  Health Maintenance Topics with due status: Not Due       Topic Last Completion Date    Colorectal Cancer Screening 02/16/2022    Diabetes Urine Screening 12/15/2023    Lipid Panel 12/15/2023    Eye Exam 01/29/2024    Low Dose Statin 06/14/2024    Foot Exam 06/14/2024       Procedures     No future appointments.     Follow up in about 6 months (around 12/14/2024) for chronic health problems, diabetes, hypertension.     Signature:  Amol Gonzalez MD  [address] [place] [address]  Phone #: [phone]  Fax #: [phone]    Date of encounter: 6/14/24    There are no Patient Instructions on file for this visit.

## 2024-11-29 DIAGNOSIS — E11.9 TYPE 2 DIABETES MELLITUS WITHOUT COMPLICATION, WITHOUT LONG-TERM CURRENT USE OF INSULIN: Chronic | ICD-10-CM

## 2024-11-29 RX ORDER — TIRZEPATIDE 10 MG/.5ML
INJECTION, SOLUTION SUBCUTANEOUS
Qty: 12 ML | Refills: 0 | Status: SHIPPED | OUTPATIENT
Start: 2024-11-29

## 2024-12-13 ENCOUNTER — OFFICE VISIT (OUTPATIENT)
Dept: FAMILY MEDICINE | Facility: CLINIC | Age: 59
End: 2024-12-13
Payer: COMMERCIAL

## 2024-12-13 VITALS
RESPIRATION RATE: 16 BRPM | SYSTOLIC BLOOD PRESSURE: 120 MMHG | OXYGEN SATURATION: 95 % | HEART RATE: 87 BPM | BODY MASS INDEX: 43.44 KG/M2 | DIASTOLIC BLOOD PRESSURE: 78 MMHG | HEIGHT: 68 IN | TEMPERATURE: 98 F | WEIGHT: 286.63 LBS

## 2024-12-13 DIAGNOSIS — I10 ESSENTIAL HYPERTENSION, BENIGN: Chronic | ICD-10-CM

## 2024-12-13 DIAGNOSIS — E78.2 MIXED HYPERLIPIDEMIA: Chronic | ICD-10-CM

## 2024-12-13 DIAGNOSIS — E66.813 CLASS 3 SEVERE OBESITY DUE TO EXCESS CALORIES WITH SERIOUS COMORBIDITY AND BODY MASS INDEX (BMI) OF 40.0 TO 44.9 IN ADULT: Chronic | ICD-10-CM

## 2024-12-13 DIAGNOSIS — E66.01 CLASS 3 SEVERE OBESITY DUE TO EXCESS CALORIES WITH SERIOUS COMORBIDITY AND BODY MASS INDEX (BMI) OF 40.0 TO 44.9 IN ADULT: Chronic | ICD-10-CM

## 2024-12-13 DIAGNOSIS — N52.8 OTHER MALE ERECTILE DYSFUNCTION: Chronic | ICD-10-CM

## 2024-12-13 DIAGNOSIS — E11.9 TYPE 2 DIABETES MELLITUS WITHOUT COMPLICATION, WITHOUT LONG-TERM CURRENT USE OF INSULIN: Primary | Chronic | ICD-10-CM

## 2024-12-13 LAB
ANION GAP SERPL CALCULATED.3IONS-SCNC: 13 MMOL/L (ref 7–16)
BILIRUB UR QL STRIP: NEGATIVE
BUN SERPL-MCNC: 16 MG/DL (ref 8–26)
BUN/CREAT SERPL: 23 (ref 6–20)
CALCIUM SERPL-MCNC: 8.9 MG/DL (ref 8.4–10.2)
CHLORIDE SERPL-SCNC: 104 MMOL/L (ref 98–107)
CLARITY UR: CLEAR
CO2 SERPL-SCNC: 28 MMOL/L (ref 22–29)
COLOR UR: NORMAL
CREAT SERPL-MCNC: 0.71 MG/DL (ref 0.72–1.25)
EGFR (NO RACE VARIABLE) (RUSH/TITUS): 106 ML/MIN/1.73M2
EST. AVERAGE GLUCOSE BLD GHB EST-MCNC: 114 MG/DL
GLUCOSE SERPL-MCNC: 99 MG/DL (ref 74–100)
GLUCOSE UR STRIP-MCNC: NORMAL MG/DL
HBA1C MFR BLD HPLC: 5.6 %
KETONES UR STRIP-SCNC: NEGATIVE MG/DL
LEUKOCYTE ESTERASE UR QL STRIP: NEGATIVE
NITRITE UR QL STRIP: NEGATIVE
PH UR STRIP: 6.5 PH UNITS
POTASSIUM SERPL-SCNC: 4.2 MMOL/L (ref 3.5–5.1)
PROT UR QL STRIP: NEGATIVE
RBC # UR STRIP: NEGATIVE /UL
SODIUM SERPL-SCNC: 141 MMOL/L (ref 136–145)
SP GR UR STRIP: 1.03
UROBILINOGEN UR STRIP-ACNC: NORMAL MG/DL

## 2024-12-13 PROCEDURE — 83036 HEMOGLOBIN GLYCOSYLATED A1C: CPT | Mod: ,,, | Performed by: CLINICAL MEDICAL LABORATORY

## 2024-12-13 PROCEDURE — 81003 URINALYSIS AUTO W/O SCOPE: CPT | Mod: QW,,, | Performed by: CLINICAL MEDICAL LABORATORY

## 2024-12-13 PROCEDURE — 80048 BASIC METABOLIC PNL TOTAL CA: CPT | Mod: ,,, | Performed by: CLINICAL MEDICAL LABORATORY

## 2024-12-13 RX ORDER — METFORMIN HYDROCHLORIDE 1000 MG/1
1000 TABLET ORAL 2 TIMES DAILY WITH MEALS
Qty: 180 TABLET | Refills: 1 | Status: SHIPPED | OUTPATIENT
Start: 2024-12-13

## 2024-12-13 RX ORDER — TIRZEPATIDE 15 MG/.5ML
15 INJECTION, SOLUTION SUBCUTANEOUS
Qty: 6 ML | Refills: 1 | Status: SHIPPED | OUTPATIENT
Start: 2024-12-13

## 2024-12-13 RX ORDER — HYDROCHLOROTHIAZIDE 12.5 MG/1
12.5 TABLET ORAL DAILY
Qty: 90 TABLET | Refills: 1 | Status: SHIPPED | OUTPATIENT
Start: 2024-12-13

## 2024-12-13 RX ORDER — AMLODIPINE BESYLATE 10 MG/1
TABLET ORAL
Qty: 90 TABLET | Refills: 1 | Status: SHIPPED | OUTPATIENT
Start: 2024-12-13

## 2024-12-13 RX ORDER — SILDENAFIL 100 MG/1
100 TABLET, FILM COATED ORAL DAILY PRN
Qty: 30 TABLET | Refills: 5 | Status: SHIPPED | OUTPATIENT
Start: 2024-12-13

## 2024-12-13 RX ORDER — LISINOPRIL 20 MG/1
20 TABLET ORAL DAILY
COMMUNITY
End: 2024-12-13 | Stop reason: SDUPTHER

## 2024-12-13 RX ORDER — CARVEDILOL 25 MG/1
TABLET ORAL
Qty: 180 TABLET | Refills: 1 | Status: SHIPPED | OUTPATIENT
Start: 2024-12-13

## 2024-12-13 RX ORDER — TIRZEPATIDE 10 MG/.5ML
10 INJECTION, SOLUTION SUBCUTANEOUS WEEKLY
Qty: 12 ML | Refills: 1 | Status: SHIPPED | OUTPATIENT
Start: 2024-12-13 | End: 2024-12-13

## 2024-12-13 RX ORDER — ATORVASTATIN CALCIUM 40 MG/1
40 TABLET, FILM COATED ORAL NIGHTLY
Qty: 90 TABLET | Refills: 1 | Status: SHIPPED | OUTPATIENT
Start: 2024-12-13

## 2024-12-13 RX ORDER — LISINOPRIL 20 MG/1
20 TABLET ORAL DAILY
Qty: 90 TABLET | Refills: 1 | Status: SHIPPED | OUTPATIENT
Start: 2024-12-13

## 2024-12-13 NOTE — PROGRESS NOTES
Amol Gonzalez MD    [address] [place] [address]     PATIENT NAME: Kalpesh Saleem  : 1965  DATE: 24  MRN: [id number]      Billing Provider: Amol Gonzalez MD  Level of Service: HI OFFICE/OUTPT VISIT, EST, LEVL IV, 30-39 MIN  Patient PCP Information       Provider PCP Type    Amol Gonzalez MD General            Reason for Visit / Chief Complaint: Diabetes, Hypertension (6 month follow up), and Health Maintenance (Eye exam appt  at [facility], refuses all vaccines today.)       Update PCP  Update Chief Complaint         History of Present Illness / Problem Focused Workflow     Kalpesh Saleem presents to the clinic with Diabetes, Hypertension (6 month follow up), and Health Maintenance (Eye exam appt  at [facility], refuses all vaccines today.)     Asks about a higher dose on Mounjaro, in general he is doing well but the dose seems to be less effective     Viagra is helping, but at the 100mg dose     HPI    Review of Systems     Review of Systems   Constitutional:  Negative for activity change, appetite change, chills, fatigue and fever.   HENT:  Negative for nasal congestion, ear pain, hearing loss, postnasal drip and sore throat.    Respiratory:  Negative for cough, chest tightness, shortness of breath and wheezing.    Cardiovascular:  Negative for chest pain, palpitations, leg swelling and claudication.   Gastrointestinal:  Negative for abdominal pain, change in bowel habit, constipation, diarrhea, nausea and vomiting.   Genitourinary:  Negative for dysuria.   Musculoskeletal:  Negative for arthralgias, back pain, gait problem and myalgias.   Integumentary:  Negative for rash.   Neurological:  Negative for weakness and headaches.   Psychiatric/Behavioral:  Negative for suicidal ideas. The patient is not nervous/anxious.         Medical / Social / Family History     Past Medical History:   Diagnosis Date    Diabetes  mellitus, type 2     Diabetic eye exam 01/29/2024    Dr. Avery Cuello, [facility]    Hyperlipidemia     Hypertension     Sleep apnea        History reviewed. No pertinent surgical history.    Social History    reports that he has quit smoking. He has never been exposed to tobacco smoke. He has never used smokeless tobacco. He reports current alcohol use of about 8.0 standard drinks of alcohol per week. He reports that he does not use drugs.    Family History  's family history includes Diabetes in his mother.    Medications and Allergies     Medications  Outpatient Medications Marked as Taking for the 12/13/24 encounter (Office Visit) with Amol Gonzalez MD   Medication Sig Dispense Refill    aspirin 81 MG Chew Take 81 mg by mouth once daily.      sildenafiL (VIAGRA) 100 MG tablet Take 1 tablet (100 mg total) by mouth daily as needed for Erectile Dysfunction. 30 tablet 2    [DISCONTINUED] amLODIPine (NORVASC) 10 MG tablet TAKE 1 TABLET BY MOUTH EVERY DAY FOR BLOOD PRESSURE 90 tablet 1    [DISCONTINUED] atorvastatin (LIPITOR) 40 MG tablet Take 1 tablet (40 mg total) by mouth nightly. For CHOLESTEROL 90 tablet 1    [DISCONTINUED] carvediloL (COREG) 25 MG tablet TAKE 1 TABLET BY MOUTH TWICE A DAY FOR BLOOD PRESSURE 180 tablet 1    [DISCONTINUED] hydroCHLOROthiazide (HYDRODIURIL) 12.5 MG Tab Take 1 tablet (12.5 mg total) by mouth once daily. FOR BLOOD PRESSURE 90 tablet 1    [DISCONTINUED] lisinopriL (PRINIVIL,ZESTRIL) 20 MG tablet Take 20 mg by mouth once daily.      [DISCONTINUED] lisinopriL (PRINIVIL,ZESTRIL) 40 MG tablet Take 1 tablet (40 mg total) by mouth once daily. FOR BLOOD PRESSURE 90 tablet 1    [DISCONTINUED] metFORMIN (GLUCOPHAGE) 1000 MG tablet Take 1 tablet (1,000 mg total) by mouth 2 (two) times daily with meals. FOR DIABETES. 180 tablet 1    [DISCONTINUED] MOUNJARO 10 mg/0.5 mL PnIj INJECT 10 MG SUBCUTANEOUSLY  ONCE A WEEK 12 mL 0       Allergies  Review of patient's allergies  indicates:  No Known Allergies    Physical Examination     Vitals:    12/13/24 0801   BP: 120/78   Pulse: 87   Resp: 16   Temp: 97.9 °F (36.6 °C)     Physical Exam  Vitals and nursing note reviewed.   Constitutional:       General: He is not in acute distress.     Appearance: Normal appearance. He is obese. He is not ill-appearing.   Eyes:      Extraocular Movements: Extraocular movements intact.      Pupils: Pupils are equal, round, and reactive to light.   Cardiovascular:      Rate and Rhythm: Normal rate and regular rhythm.   Pulmonary:      Effort: Pulmonary effort is normal.      Breath sounds: Normal breath sounds.   Skin:     General: Skin is warm.      Findings: No rash.   Neurological:      General: No focal deficit present.      Mental Status: He is alert and oriented to person, place, and time. Mental status is at baseline.   Psychiatric:         Mood and Affect: Mood normal.         Behavior: Behavior normal.            Assessment and Plan (including Health Maintenance)      Problem List  Smart [place]  Document Outside HM   :    Plan:     Diabetes is generally well controlled     Weight has come down drastically over the past 2 years. Continues to slowly decline     Health Maintenance Due   Topic Date Due    TETANUS VACCINE  Never done    Shingles Vaccine (2 of 2) 12/26/2023    COVID-19 Vaccine (3 - 2024-25 season) 09/01/2024    Hemoglobin A1c  12/14/2024    Eye Exam  01/29/2025       Problem List Items Addressed This Visit          Cardiac/Vascular    Essential hypertension, benign (Chronic)    Overview      - Goal blood pressure for most patients is less than 130/85. Both numbers are important. If you have questions about your specific goal blood pressure, please ask at your clinic visits.   - Check blood pressure outside of clinic, record the numbers, and bring the log to all your office visits.   - If you have any chest pain, SOB, or other concerning symptoms, report these immediately, or go to the  nearest ER.    - Recommend cardiovascular exercise, at least 3 times per week, for at least 15 minutes.   - Eat a heart healthy diet.            Relevant Medications    lisinopriL (PRINIVIL,ZESTRIL) 20 MG tablet    hydroCHLOROthiazide (HYDRODIURIL) 12.5 MG Tab    amLODIPine (NORVASC) 10 MG tablet    carvediloL (COREG) 25 MG tablet    Other Relevant Orders    Basic Metabolic Panel    Urinalysis, Reflex to Urine Culture    Mixed hyperlipidemia (Chronic)    Relevant Orders    Hemoglobin A1C       Renal/    Other male erectile dysfunction (Chronic)       Endocrine    Type 2 diabetes mellitus without complication, without long-term current use of insulin - Primary (Chronic)    Current Assessment & Plan      - Check glucose outside of clinic, record numbers, and bring log to follow up visit.    - Take medications as directed, and bring all medications to every clinic appointment.    - Eat a diabetic diet, if there are concerns about what that entails, we have a diabetic educator in our clinic.    - Cardiovascular exercise at least 3 times per week, for at least 15 minutes.    - Patient should be on a Statin medication, unless patient cannot tolerate a Statin.     - Recommend yearly, dilated eye exams for all Diabetic Patients.    - Monitor feet for calluses, abrasion, or other abnormalities. Report any concerns at every clinic visit.    -   Hemoglobin A1C   Date Value Ref Range Status   06/14/2024 5.7 4.5 - 6.6 % Final     Comment:       Normal:               <5.7%  Pre-Diabetic:       5.7% to 6.4%  Diabetic:             >6.4%  Diabetic Goal:     <7%   12/15/2023 5.4 4.5 - 6.6 % Final     Comment:       Normal:               <5.7%  Pre-Diabetic:       5.7% to 6.4%  Diabetic:             >6.4%  Diabetic Goal:     <7%   06/23/2023 5.9 4.5 - 6.6 % Final     Comment:       Normal:               <5.7%  Pre-Diabetic:       5.7% to 6.4%  Diabetic:             >6.4%  Diabetic Goal:     <7%               Relevant Medications     atorvastatin (LIPITOR) 40 MG tablet    metFORMIN (GLUCOPHAGE) 1000 MG tablet    tirzepatide (MOUNJARO) 15 mg/0.5 mL PnIj    Other Relevant Orders    Basic Metabolic Panel    Urinalysis, Reflex to Urine Culture    Hemoglobin A1C    Class 3 severe obesity due to excess calories with serious comorbidity and body mass index (BMI) of 40.0 to 44.9 in adult (Chronic)       Health Maintenance Topics with due status: Not Due       Topic Last Completion Date    Colorectal Cancer Screening 02/16/2022    Diabetes Urine Screening 06/14/2024    Foot Exam 06/14/2024    Lipid Panel 06/14/2024    Low Dose Statin 12/13/2024    RSV Vaccine (Age 60+ and Pregnant patients) Not Due       Procedures     No future appointments.     Follow up in about 6 months (around 6/13/2025) for chronic health problems, hypertension, diabetes.     Signature:  Amol Gonzalez MD  [address] [place] [address]  Phone #: [phone]  Fax #: [phone]    Date of encounter: 12/13/24    There are no Patient Instructions on file for this visit.

## 2024-12-13 NOTE — ASSESSMENT & PLAN NOTE
- Check glucose outside of clinic, record numbers, and bring log to follow up visit.    - Take medications as directed, and bring all medications to every clinic appointment.    - Eat a diabetic diet, if there are concerns about what that entails, we have a diabetic educator in our clinic.    - Cardiovascular exercise at least 3 times per week, for at least 15 minutes.    - Patient should be on a Statin medication, unless patient cannot tolerate a Statin.     - Recommend yearly, dilated eye exams for all Diabetic Patients.    - Monitor feet for calluses, abrasion, or other abnormalities. Report any concerns at every clinic visit.    -   Hemoglobin A1C   Date Value Ref Range Status   06/14/2024 5.7 4.5 - 6.6 % Final     Comment:       Normal:               <5.7%  Pre-Diabetic:       5.7% to 6.4%  Diabetic:             >6.4%  Diabetic Goal:     <7%   12/15/2023 5.4 4.5 - 6.6 % Final     Comment:       Normal:               <5.7%  Pre-Diabetic:       5.7% to 6.4%  Diabetic:             >6.4%  Diabetic Goal:     <7%   06/23/2023 5.9 4.5 - 6.6 % Final     Comment:       Normal:               <5.7%  Pre-Diabetic:       5.7% to 6.4%  Diabetic:             >6.4%  Diabetic Goal:     <7%

## 2025-01-27 LAB
LEFT EYE DM RETINOPATHY: NEGATIVE
RIGHT EYE DM RETINOPATHY: NEGATIVE

## 2025-05-31 DIAGNOSIS — I10 ESSENTIAL HYPERTENSION, BENIGN: Chronic | ICD-10-CM

## 2025-06-02 RX ORDER — HYDROCHLOROTHIAZIDE 12.5 MG/1
12.5 TABLET ORAL
Qty: 90 TABLET | Refills: 1 | Status: SHIPPED | OUTPATIENT
Start: 2025-06-02 | End: 2025-06-06 | Stop reason: SDUPTHER

## 2025-06-02 RX ORDER — LISINOPRIL 20 MG/1
20 TABLET ORAL
Qty: 90 TABLET | Refills: 1 | Status: SHIPPED | OUTPATIENT
Start: 2025-06-02 | End: 2025-06-06 | Stop reason: SDUPTHER

## 2025-06-06 ENCOUNTER — OFFICE VISIT (OUTPATIENT)
Dept: FAMILY MEDICINE | Facility: CLINIC | Age: 60
End: 2025-06-06
Payer: COMMERCIAL

## 2025-06-06 ENCOUNTER — PATIENT OUTREACH (OUTPATIENT)
Facility: HOSPITAL | Age: 60
End: 2025-06-06
Payer: COMMERCIAL

## 2025-06-06 VITALS
HEIGHT: 68 IN | BODY MASS INDEX: 42.23 KG/M2 | DIASTOLIC BLOOD PRESSURE: 76 MMHG | TEMPERATURE: 98 F | WEIGHT: 278.63 LBS | HEART RATE: 89 BPM | OXYGEN SATURATION: 95 % | SYSTOLIC BLOOD PRESSURE: 116 MMHG | RESPIRATION RATE: 16 BRPM

## 2025-06-06 DIAGNOSIS — E11.9 TYPE 2 DIABETES MELLITUS WITHOUT COMPLICATION, WITHOUT LONG-TERM CURRENT USE OF INSULIN: Primary | Chronic | ICD-10-CM

## 2025-06-06 DIAGNOSIS — E78.2 MIXED HYPERLIPIDEMIA: Chronic | ICD-10-CM

## 2025-06-06 DIAGNOSIS — Z12.5 ENCOUNTER FOR PROSTATE CANCER SCREENING: ICD-10-CM

## 2025-06-06 DIAGNOSIS — I10 ESSENTIAL HYPERTENSION, BENIGN: Chronic | ICD-10-CM

## 2025-06-06 DIAGNOSIS — E66.813 CLASS 3 SEVERE OBESITY DUE TO EXCESS CALORIES WITH SERIOUS COMORBIDITY AND BODY MASS INDEX (BMI) OF 40.0 TO 44.9 IN ADULT: Chronic | ICD-10-CM

## 2025-06-06 DIAGNOSIS — E66.01 CLASS 3 SEVERE OBESITY DUE TO EXCESS CALORIES WITH SERIOUS COMORBIDITY AND BODY MASS INDEX (BMI) OF 40.0 TO 44.9 IN ADULT: Chronic | ICD-10-CM

## 2025-06-06 LAB
ANION GAP SERPL CALCULATED.3IONS-SCNC: 15 MMOL/L (ref 7–16)
BASOPHILS # BLD AUTO: 0.06 K/UL (ref 0–0.2)
BASOPHILS NFR BLD AUTO: 0.8 % (ref 0–1)
BILIRUB UR QL STRIP: NEGATIVE
BUN SERPL-MCNC: 15 MG/DL (ref 8–26)
BUN/CREAT SERPL: 18 (ref 6–20)
CALCIUM SERPL-MCNC: 9.6 MG/DL (ref 8.4–10.2)
CHLORIDE SERPL-SCNC: 103 MMOL/L (ref 98–107)
CHOLEST SERPL-MCNC: 120 MG/DL
CHOLEST/HDLC SERPL: 3.9 {RATIO}
CLARITY UR: CLEAR
CO2 SERPL-SCNC: 29 MMOL/L (ref 22–29)
COLOR UR: NORMAL
CREAT SERPL-MCNC: 0.83 MG/DL (ref 0.72–1.25)
CREAT UR-MCNC: 123 MG/DL (ref 23–375)
DIFFERENTIAL METHOD BLD: ABNORMAL
EGFR (NO RACE VARIABLE) (RUSH/TITUS): 101 ML/MIN/1.73M2
EOSINOPHIL # BLD AUTO: 0.25 K/UL (ref 0–0.5)
EOSINOPHIL NFR BLD AUTO: 3.4 % (ref 1–4)
ERYTHROCYTE [DISTWIDTH] IN BLOOD BY AUTOMATED COUNT: 13.7 % (ref 11.5–14.5)
EST. AVERAGE GLUCOSE BLD GHB EST-MCNC: 111 MG/DL
GLUCOSE SERPL-MCNC: 91 MG/DL (ref 74–100)
GLUCOSE UR STRIP-MCNC: NORMAL MG/DL
HBA1C MFR BLD HPLC: 5.5 %
HCT VFR BLD AUTO: 40.2 % (ref 40–54)
HDLC SERPL-MCNC: 31 MG/DL (ref 35–60)
HGB BLD-MCNC: 12.8 G/DL (ref 13.5–18)
IMM GRANULOCYTES # BLD AUTO: 0.02 K/UL (ref 0–0.04)
IMM GRANULOCYTES NFR BLD: 0.3 % (ref 0–0.4)
KETONES UR STRIP-SCNC: NEGATIVE MG/DL
LDLC SERPL CALC-MCNC: 68 MG/DL
LDLC/HDLC SERPL: 2.2 {RATIO}
LEUKOCYTE ESTERASE UR QL STRIP: NEGATIVE
LYMPHOCYTES # BLD AUTO: 1.88 K/UL (ref 1–4.8)
LYMPHOCYTES NFR BLD AUTO: 25.9 % (ref 27–41)
MCH RBC QN AUTO: 29.4 PG (ref 27–31)
MCHC RBC AUTO-ENTMCNC: 31.8 G/DL (ref 32–36)
MCV RBC AUTO: 92.2 FL (ref 80–96)
MICROALBUMIN UR-MCNC: 0.6 MG/DL
MICROALBUMIN/CREAT RATIO PNL UR: 4.9 MG/G (ref 0–30)
MONOCYTES # BLD AUTO: 0.59 K/UL (ref 0–0.8)
MONOCYTES NFR BLD AUTO: 8.1 % (ref 2–6)
MPC BLD CALC-MCNC: 11.6 FL (ref 9.4–12.4)
NEUTROPHILS # BLD AUTO: 4.46 K/UL (ref 1.8–7.7)
NEUTROPHILS NFR BLD AUTO: 61.5 % (ref 53–65)
NITRITE UR QL STRIP: NEGATIVE
NONHDLC SERPL-MCNC: 89 MG/DL
NRBC # BLD AUTO: 0 X10E3/UL
NRBC, AUTO (.00): 0 %
PH UR STRIP: 6.5 PH UNITS
PLATELET # BLD AUTO: 215 K/UL (ref 150–400)
POTASSIUM SERPL-SCNC: 4.7 MMOL/L (ref 3.5–5.1)
PROT UR QL STRIP: NEGATIVE
PSA SERPL-MCNC: 0.18 NG/ML
RBC # BLD AUTO: 4.36 M/UL (ref 4.6–6.2)
RBC # UR STRIP: NEGATIVE /UL
SODIUM SERPL-SCNC: 142 MMOL/L (ref 136–145)
SP GR UR STRIP: 1.02
TRIGL SERPL-MCNC: 104 MG/DL (ref 34–140)
UROBILINOGEN UR STRIP-ACNC: NORMAL MG/DL
VLDLC SERPL-MCNC: 21 MG/DL
WBC # BLD AUTO: 7.26 K/UL (ref 4.5–11)

## 2025-06-06 PROCEDURE — 82043 UR ALBUMIN QUANTITATIVE: CPT | Mod: ,,, | Performed by: CLINICAL MEDICAL LABORATORY

## 2025-06-06 PROCEDURE — 83036 HEMOGLOBIN GLYCOSYLATED A1C: CPT | Mod: ,,, | Performed by: CLINICAL MEDICAL LABORATORY

## 2025-06-06 PROCEDURE — G0103 PSA SCREENING: HCPCS | Mod: ,,, | Performed by: CLINICAL MEDICAL LABORATORY

## 2025-06-06 PROCEDURE — 86850 RBC ANTIBODY SCREEN: CPT | Mod: ,,, | Performed by: CLINICAL MEDICAL LABORATORY

## 2025-06-06 PROCEDURE — 4010F ACE/ARB THERAPY RXD/TAKEN: CPT | Mod: CPTII,,, | Performed by: FAMILY MEDICINE

## 2025-06-06 PROCEDURE — 82570 ASSAY OF URINE CREATININE: CPT | Mod: ,,, | Performed by: CLINICAL MEDICAL LABORATORY

## 2025-06-06 PROCEDURE — 86900 BLOOD TYPING SEROLOGIC ABO: CPT | Mod: ,,, | Performed by: CLINICAL MEDICAL LABORATORY

## 2025-06-06 PROCEDURE — 80048 BASIC METABOLIC PNL TOTAL CA: CPT | Mod: ,,, | Performed by: CLINICAL MEDICAL LABORATORY

## 2025-06-06 PROCEDURE — 80061 LIPID PANEL: CPT | Mod: ,,, | Performed by: CLINICAL MEDICAL LABORATORY

## 2025-06-06 PROCEDURE — 85025 COMPLETE CBC W/AUTO DIFF WBC: CPT | Mod: ,,, | Performed by: CLINICAL MEDICAL LABORATORY

## 2025-06-06 PROCEDURE — 3061F NEG MICROALBUMINURIA REV: CPT | Mod: CPTII,,, | Performed by: FAMILY MEDICINE

## 2025-06-06 PROCEDURE — 1160F RVW MEDS BY RX/DR IN RCRD: CPT | Mod: CPTII,,, | Performed by: FAMILY MEDICINE

## 2025-06-06 PROCEDURE — 3008F BODY MASS INDEX DOCD: CPT | Mod: CPTII,,, | Performed by: FAMILY MEDICINE

## 2025-06-06 PROCEDURE — 86901 BLOOD TYPING SEROLOGIC RH(D): CPT | Mod: ,,, | Performed by: CLINICAL MEDICAL LABORATORY

## 2025-06-06 PROCEDURE — 3044F HG A1C LEVEL LT 7.0%: CPT | Mod: CPTII,,, | Performed by: FAMILY MEDICINE

## 2025-06-06 PROCEDURE — 3066F NEPHROPATHY DOC TX: CPT | Mod: CPTII,,, | Performed by: FAMILY MEDICINE

## 2025-06-06 PROCEDURE — 3078F DIAST BP <80 MM HG: CPT | Mod: CPTII,,, | Performed by: FAMILY MEDICINE

## 2025-06-06 PROCEDURE — 2023F DILAT RTA XM W/O RTNOPTHY: CPT | Mod: CPTII,,, | Performed by: FAMILY MEDICINE

## 2025-06-06 PROCEDURE — 1159F MED LIST DOCD IN RCRD: CPT | Mod: CPTII,,, | Performed by: FAMILY MEDICINE

## 2025-06-06 PROCEDURE — 81003 URINALYSIS AUTO W/O SCOPE: CPT | Mod: QW,,, | Performed by: CLINICAL MEDICAL LABORATORY

## 2025-06-06 PROCEDURE — 99214 OFFICE O/P EST MOD 30 MIN: CPT | Mod: ,,, | Performed by: FAMILY MEDICINE

## 2025-06-06 PROCEDURE — 3074F SYST BP LT 130 MM HG: CPT | Mod: CPTII,,, | Performed by: FAMILY MEDICINE

## 2025-06-06 RX ORDER — HYDROCHLOROTHIAZIDE 12.5 MG/1
12.5 TABLET ORAL DAILY
Qty: 90 TABLET | Refills: 1 | Status: SHIPPED | OUTPATIENT
Start: 2025-06-06

## 2025-06-06 RX ORDER — TIRZEPATIDE 15 MG/.5ML
15 INJECTION, SOLUTION SUBCUTANEOUS
Qty: 6 ML | Refills: 1 | Status: SHIPPED | OUTPATIENT
Start: 2025-06-06

## 2025-06-06 RX ORDER — AMLODIPINE BESYLATE 10 MG/1
TABLET ORAL
Qty: 90 TABLET | Refills: 1 | Status: SHIPPED | OUTPATIENT
Start: 2025-06-06

## 2025-06-06 RX ORDER — LISINOPRIL 20 MG/1
20 TABLET ORAL DAILY
Qty: 90 TABLET | Refills: 1 | Status: SHIPPED | OUTPATIENT
Start: 2025-06-06

## 2025-06-06 RX ORDER — ATORVASTATIN CALCIUM 40 MG/1
40 TABLET, FILM COATED ORAL NIGHTLY
Qty: 90 TABLET | Refills: 1 | Status: SHIPPED | OUTPATIENT
Start: 2025-06-06

## 2025-06-06 RX ORDER — CARVEDILOL 25 MG/1
TABLET ORAL
Qty: 180 TABLET | Refills: 1 | Status: SHIPPED | OUTPATIENT
Start: 2025-06-06

## 2025-06-06 RX ORDER — METFORMIN HYDROCHLORIDE 1000 MG/1
1000 TABLET ORAL 2 TIMES DAILY WITH MEALS
Qty: 180 TABLET | Refills: 1 | Status: SHIPPED | OUTPATIENT
Start: 2025-06-06

## 2025-06-06 NOTE — PROGRESS NOTES
Amol Gonzalez MD    [address] [place] [address]     PATIENT NAME: Kalpesh Saleem  : 1965  DATE: 25  MRN: [id number]      Billing Provider: Amol Gonzalez MD  Level of Service: DE OFFICE/OUTPT VISIT, EST, LEVL IV, 30-39 MIN  Patient PCP Information       Provider PCP Type    Amol Gonzalez MD General                   Update PCP  Update Chief Complaint         History of Present Illness / Problem Focused Workflow     Kalpesh Saleem presents to the clinic with   Chief Complaint   Patient presents with    Diabetes    Hypertension    Hyperlipidemia     6 month follow up    Health Maintenance     TETANUS VACCINE Never done refuses  COVID-19 Vaccine(3 - 2024-25 season) refuses  Diabetic Eye Exam due on 2025  Hemoglobin A1c due on 2025  Diabetes Urine Screening due on 2025  Foot Exam due on 2025  Lipid Panel due on 2025       HPI    Review of Systems     Review of Systems   Constitutional:  Negative for activity change, appetite change, chills, fatigue and fever.   HENT:  Negative for nasal congestion, ear pain, hearing loss, postnasal drip and sore throat.    Respiratory:  Negative for cough, chest tightness, shortness of breath and wheezing.    Cardiovascular:  Negative for chest pain, palpitations, leg swelling and claudication.   Gastrointestinal:  Negative for abdominal pain, change in bowel habit, constipation, diarrhea, nausea and vomiting.   Genitourinary:  Negative for dysuria.   Musculoskeletal:  Negative for arthralgias, back pain, gait problem and myalgias.   Integumentary:  Negative for rash.   Neurological:  Negative for weakness and headaches.   Psychiatric/Behavioral:  Negative for suicidal ideas. The patient is not nervous/anxious.         Medical / Social / Family History     Past Medical History:   Diagnosis Date    Diabetes mellitus, type 2     Diabetic eye exam 2024    Dr. Avery Cuello, [place] -  [facility]    Diabetic eye exam 01/27/2025    Dr. Avery Cuello - [facility]    Hyperlipidemia     Hypertension     Sleep apnea        History reviewed. No pertinent surgical history.    Social History    reports that he has quit smoking. He has never been exposed to tobacco smoke. He has never used smokeless tobacco. He reports current alcohol use of about 8.0 standard drinks of alcohol per week. He reports that he does not use drugs.    Family History  's family history includes Diabetes in his mother.    Medications and Allergies     Medications  Outpatient Medications Marked as Taking for the 6/6/25 encounter (Office Visit) with Amol Gonzalez MD   Medication Sig Dispense Refill    aspirin 81 MG Chew Take 81 mg by mouth once daily.      sildenafiL (VIAGRA) 100 MG tablet TAKE 1 TABLET BY MOUTH ONCE DAILY AS NEEDED FOR ERECTILE DYSFUNCTION 30 tablet 5    [DISCONTINUED] amLODIPine (NORVASC) 10 MG tablet TAKE 1 TABLET BY MOUTH EVERY DAY FOR BLOOD PRESSURE 90 tablet 1    [DISCONTINUED] atorvastatin (LIPITOR) 40 MG tablet Take 1 tablet (40 mg total) by mouth nightly. For CHOLESTEROL 90 tablet 1    [DISCONTINUED] carvediloL (COREG) 25 MG tablet TAKE 1 TABLET BY MOUTH TWICE A DAY FOR BLOOD PRESSURE 180 tablet 1    [DISCONTINUED] hydroCHLOROthiazide 12.5 MG Tab Take 1 tablet by mouth once daily for blood pressure 90 tablet 1    [DISCONTINUED] lisinopriL (PRINIVIL,ZESTRIL) 20 MG tablet Take 1 tablet by mouth once daily for blood pressure 90 tablet 1    [DISCONTINUED] metFORMIN (GLUCOPHAGE) 1000 MG tablet Take 1 tablet (1,000 mg total) by mouth 2 (two) times daily with meals. FOR DIABETES. 180 tablet 1    [DISCONTINUED] tirzepatide (MOUNJARO) 15 mg/0.5 mL PnIj Inject 15 mg into the skin every 7 days. 6 mL 1       Allergies  Review of patient's allergies indicates:  No Known Allergies    Physical Examination     Vitals:    06/06/25 0802   BP: 116/76   Pulse: 89   Resp: 16   Temp: 97.7 °F (36.5 °C)      Physical Exam  Vitals and nursing note reviewed.   Constitutional:       General: He is not in acute distress.     Appearance: Normal appearance. He is not ill-appearing.   Eyes:      Extraocular Movements: Extraocular movements intact.      Pupils: Pupils are equal, round, and reactive to light.   Cardiovascular:      Rate and Rhythm: Normal rate and regular rhythm.   Pulmonary:      Effort: Pulmonary effort is normal.      Breath sounds: Normal breath sounds.   Skin:     General: Skin is warm.      Findings: No rash.   Neurological:      General: No focal deficit present.      Mental Status: He is alert and oriented to person, place, and time. Mental status is at baseline.   Psychiatric:         Mood and Affect: Mood normal.         Behavior: Behavior normal.            Assessment and Plan (including Health Maintenance)      Problem List  Smart Sets  Document Outside HM   :    Plan:     Labs and refills     He is doing fairly well, discussed weight loss options     Health Maintenance Due   Topic Date Due    TETANUS VACCINE  Never done    COVID-19 Vaccine (3 - 2024-25 season) 09/01/2024    Foot Exam  06/14/2025       Problem List Items Addressed This Visit          Cardiac/Vascular    Essential hypertension, benign (Chronic)    Overview    - Goal blood pressure for most patients is less than 130/85. Both numbers are important. If you have questions about your specific goal blood pressure, please ask at your clinic visits.   - Check blood pressure outside of clinic, record the numbers, and bring the log to all your office visits.   - If you have any chest pain, SOB, or other concerning symptoms, report these immediately, or go to the nearest ER.    - Recommend cardiovascular exercise, at least 3 times per week, for at least 15 minutes.   - Eat a heart healthy diet.            Relevant Medications    lisinopriL (PRINIVIL,ZESTRIL) 20 MG tablet    hydroCHLOROthiazide 12.5 MG Tab    amLODIPine (NORVASC) 10 MG tablet     carvediloL (COREG) 25 MG tablet    Other Relevant Orders    Basic Metabolic Panel (Completed)    CBC Auto Differential (Completed)    Lipid Panel (Completed)    Microalbumin/Creatinine Ratio, Urine (Completed)    Urinalysis, Reflex to Urine Culture (Completed)    Type & Screen (Completed)    Mixed hyperlipidemia (Chronic)    Relevant Orders    Lipid Panel (Completed)       Endocrine    Type 2 diabetes mellitus without complication, without long-term current use of insulin - Primary (Chronic)    Current Assessment & Plan    - Check glucose outside of clinic, record numbers, and bring log to follow up visit.    - Take medications as directed, and bring all medications to every clinic appointment.    - Eat a diabetic diet, if there are concerns about what that entails, we have a diabetic educator in our clinic.    - Cardiovascular exercise at least 3 times per week, for at least 15 minutes.    - Patient should be on a Statin medication, unless patient cannot tolerate a Statin.     - Recommend yearly, dilated eye exams for all Diabetic Patients.    - Monitor feet for calluses, abrasion, or other abnormalities. Report any concerns at every clinic visit.    -   Hemoglobin A1C   Date Value Ref Range Status   12/13/2024 5.6 <=7.0 % Final     Comment:       Normal:               <5.7%  Pre-Diabetic:       5.7% to 6.4%  Diabetic:             >6.4%  Diabetic Goal:     <7%   06/14/2024 5.7 4.5 - 6.6 % Final     Comment:       Normal:               <5.7%  Pre-Diabetic:       5.7% to 6.4%  Diabetic:             >6.4%  Diabetic Goal:     <7%   12/15/2023 5.4 4.5 - 6.6 % Final     Comment:       Normal:               <5.7%  Pre-Diabetic:       5.7% to 6.4%  Diabetic:             >6.4%  Diabetic Goal:     <7%               Relevant Medications    atorvastatin (LIPITOR) 40 MG tablet    tirzepatide (MOUNJARO) 15 mg/0.5 mL PnIj    metFORMIN (GLUCOPHAGE) 1000 MG tablet    Other Relevant Orders    Basic Metabolic Panel (Completed)     CBC Auto Differential (Completed)    Lipid Panel (Completed)    Microalbumin/Creatinine Ratio, Urine (Completed)    Urinalysis, Reflex to Urine Culture (Completed)    Hemoglobin A1C (Completed)    Type & Screen (Completed)    Class 3 severe obesity due to excess calories with serious comorbidity and body mass index (BMI) of 40.0 to 44.9 in adult (Chronic)     Other Visit Diagnoses         Encounter for prostate cancer screening        Relevant Orders    PSA, Screening (Completed)            Health Maintenance Topics with due status: Not Due       Topic Last Completion Date    Colorectal Cancer Screening 02/16/2022    Diabetic Eye Exam 01/27/2025    Low Dose Statin 06/06/2025    Diabetes Urine Screening 06/06/2025    Lipid Panel 06/06/2025    Hemoglobin A1c 06/06/2025    RSV Vaccine (Age 60+ and Pregnant patients) Not Due       Procedures     Future Appointments   Date Time Provider Department Center   12/5/2025  8:15 AM Amol Gonzalez MD [facility]          Follow up in about 6 months (around 12/6/2025), or if symptoms worsen or fail to improve, for chronic health problems, hypertension, diabetes.     Signature:  Amol Gonzalez MD  [address] [place] [address]  Phone #: [phone]  Fax #: [phone]    Date of encounter: 6/6/25    There are no Patient Instructions on file for this visit.

## 2025-06-06 NOTE — ASSESSMENT & PLAN NOTE
- Check glucose outside of clinic, record numbers, and bring log to follow up visit.    - Take medications as directed, and bring all medications to every clinic appointment.    - Eat a diabetic diet, if there are concerns about what that entails, we have a diabetic educator in our clinic.    - Cardiovascular exercise at least 3 times per week, for at least 15 minutes.    - Patient should be on a Statin medication, unless patient cannot tolerate a Statin.     - Recommend yearly, dilated eye exams for all Diabetic Patients.    - Monitor feet for calluses, abrasion, or other abnormalities. Report any concerns at every clinic visit.    -   Hemoglobin A1C   Date Value Ref Range Status   12/13/2024 5.6 <=7.0 % Final     Comment:       Normal:               <5.7%  Pre-Diabetic:       5.7% to 6.4%  Diabetic:             >6.4%  Diabetic Goal:     <7%   06/14/2024 5.7 4.5 - 6.6 % Final     Comment:       Normal:               <5.7%  Pre-Diabetic:       5.7% to 6.4%  Diabetic:             >6.4%  Diabetic Goal:     <7%   12/15/2023 5.4 4.5 - 6.6 % Final     Comment:       Normal:               <5.7%  Pre-Diabetic:       5.7% to 6.4%  Diabetic:             >6.4%  Diabetic Goal:     <7%

## 2025-06-09 ENCOUNTER — RESULTS FOLLOW-UP (OUTPATIENT)
Dept: FAMILY MEDICINE | Facility: CLINIC | Age: 60
End: 2025-06-09

## 2025-06-09 LAB
INDIRECT COOMBS: NORMAL
RH BLD: NORMAL
SPECIMEN OUTDATE: NORMAL

## 2025-06-10 ENCOUNTER — PATIENT OUTREACH (OUTPATIENT)
Facility: HOSPITAL | Age: 60
End: 2025-06-10
Payer: COMMERCIAL

## 2025-06-10 NOTE — PROGRESS NOTES
Population Health Chart Review & Patient Outreach Details    Health Maintenance Topics Addressed and Outreach Outcomes / Actions Taken:  Diabetic Eye Exam [x] HM Updated with January 2025 Eye Exam (Dr. Cuello). History Updated.